# Patient Record
Sex: FEMALE | Race: WHITE | HISPANIC OR LATINO | Employment: FULL TIME | URBAN - METROPOLITAN AREA
[De-identification: names, ages, dates, MRNs, and addresses within clinical notes are randomized per-mention and may not be internally consistent; named-entity substitution may affect disease eponyms.]

---

## 2021-09-14 ENCOUNTER — HOSPITAL ENCOUNTER (OUTPATIENT)
Facility: MEDICAL CENTER | Age: 21
End: 2021-09-14
Attending: PREVENTIVE MEDICINE
Payer: COMMERCIAL

## 2021-09-14 ENCOUNTER — EH NON-PROVIDER (OUTPATIENT)
Dept: OCCUPATIONAL MEDICINE | Facility: CLINIC | Age: 21
End: 2021-09-14

## 2021-09-14 ENCOUNTER — EMPLOYEE HEALTH (OUTPATIENT)
Dept: OCCUPATIONAL MEDICINE | Facility: CLINIC | Age: 21
End: 2021-09-14

## 2021-09-14 DIAGNOSIS — Z02.1 PRE-EMPLOYMENT HEALTH SCREENING EXAMINATION: ICD-10-CM

## 2021-09-14 DIAGNOSIS — Z02.1 PRE-EMPLOYMENT DRUG SCREENING: Primary | ICD-10-CM

## 2021-09-14 DIAGNOSIS — Z02.1 PRE-EMPLOYMENT DRUG SCREENING: ICD-10-CM

## 2021-09-14 LAB
AMP AMPHETAMINE: NORMAL
BAR BARBITURATES: NORMAL
BZO BENZODIAZEPINES: NORMAL
COC COCAINE: NORMAL
INT CON NEG: NORMAL
INT CON POS: NORMAL
MDMA ECSTASY: NORMAL
MET METHAMPHETAMINES: NORMAL
MTD METHADONE: NORMAL
OPI OPIATES: NORMAL
OXY OXYCODONE: NORMAL
PCP PHENCYCLIDINE: NORMAL
POC URINE DRUG SCREEN OCDRS: NEGATIVE
THC: NORMAL

## 2021-09-14 PROCEDURE — 94375 RESPIRATORY FLOW VOLUME LOOP: CPT | Performed by: PREVENTIVE MEDICINE

## 2021-09-14 PROCEDURE — 8915 PR COMPREHENSIVE PHYSICAL: Performed by: NURSE PRACTITIONER

## 2021-09-14 PROCEDURE — 86480 TB TEST CELL IMMUN MEASURE: CPT | Performed by: PREVENTIVE MEDICINE

## 2021-09-14 PROCEDURE — 80305 DRUG TEST PRSMV DIR OPT OBS: CPT | Performed by: PREVENTIVE MEDICINE

## 2021-09-15 LAB
GAMMA INTERFERON BACKGROUND BLD IA-ACNC: 0.04 IU/ML
M TB IFN-G BLD-IMP: NEGATIVE
M TB IFN-G CD4+ BCKGRND COR BLD-ACNC: 0.04 IU/ML
MITOGEN IGNF BCKGRD COR BLD-ACNC: >10 IU/ML
QFT TB2 - NIL TBQ2: 0.01 IU/ML

## 2021-09-17 ENCOUNTER — EH NON-PROVIDER (OUTPATIENT)
Dept: OCCUPATIONAL MEDICINE | Facility: CLINIC | Age: 21
End: 2021-09-17

## 2021-09-17 DIAGNOSIS — Z71.85 IMMUNIZATION COUNSELING: ICD-10-CM

## 2022-03-31 ENCOUNTER — HOSPITAL ENCOUNTER (OUTPATIENT)
Facility: MEDICAL CENTER | Age: 22
End: 2022-03-31
Attending: OBSTETRICS & GYNECOLOGY
Payer: COMMERCIAL

## 2022-03-31 ENCOUNTER — HOSPITAL ENCOUNTER (OUTPATIENT)
Dept: LAB | Facility: MEDICAL CENTER | Age: 22
End: 2022-03-31
Attending: OBSTETRICS & GYNECOLOGY
Payer: COMMERCIAL

## 2022-03-31 PROCEDURE — 86900 BLOOD TYPING SEROLOGIC ABO: CPT

## 2022-03-31 PROCEDURE — 86803 HEPATITIS C AB TEST: CPT

## 2022-03-31 PROCEDURE — 82306 VITAMIN D 25 HYDROXY: CPT

## 2022-03-31 PROCEDURE — 85025 COMPLETE CBC W/AUTO DIFF WBC: CPT

## 2022-03-31 PROCEDURE — 86901 BLOOD TYPING SEROLOGIC RH(D): CPT

## 2022-03-31 PROCEDURE — 86850 RBC ANTIBODY SCREEN: CPT

## 2022-03-31 PROCEDURE — 87086 URINE CULTURE/COLONY COUNT: CPT

## 2022-03-31 PROCEDURE — 86762 RUBELLA ANTIBODY: CPT

## 2022-03-31 PROCEDURE — 86780 TREPONEMA PALLIDUM: CPT

## 2022-03-31 PROCEDURE — 87389 HIV-1 AG W/HIV-1&-2 AB AG IA: CPT

## 2022-03-31 PROCEDURE — 87340 HEPATITIS B SURFACE AG IA: CPT

## 2022-04-01 LAB
25(OH)D3 SERPL-MCNC: 19 NG/ML (ref 30–100)
ABO GROUP BLD: NORMAL
BASOPHILS # BLD AUTO: 0.3 % (ref 0–1.8)
BASOPHILS # BLD: 0.03 K/UL (ref 0–0.12)
BLD GP AB SCN SERPL QL: NORMAL
EOSINOPHIL # BLD AUTO: 0.11 K/UL (ref 0–0.51)
EOSINOPHIL NFR BLD: 1.2 % (ref 0–6.9)
ERYTHROCYTE [DISTWIDTH] IN BLOOD BY AUTOMATED COUNT: 48.6 FL (ref 35.9–50)
HBV SURFACE AG SER QL: NORMAL
HCT VFR BLD AUTO: 40.8 % (ref 37–47)
HCV AB SER QL: NORMAL
HGB BLD-MCNC: 12.8 G/DL (ref 12–16)
HIV 1+2 AB+HIV1 P24 AG SERPL QL IA: NORMAL
IMM GRANULOCYTES # BLD AUTO: 0.03 K/UL (ref 0–0.11)
IMM GRANULOCYTES NFR BLD AUTO: 0.3 % (ref 0–0.9)
LYMPHOCYTES # BLD AUTO: 1.96 K/UL (ref 1–4.8)
LYMPHOCYTES NFR BLD: 22.2 % (ref 22–41)
MCH RBC QN AUTO: 28.4 PG (ref 27–33)
MCHC RBC AUTO-ENTMCNC: 31.4 G/DL (ref 33.6–35)
MCV RBC AUTO: 90.7 FL (ref 81.4–97.8)
MONOCYTES # BLD AUTO: 0.49 K/UL (ref 0–0.85)
MONOCYTES NFR BLD AUTO: 5.6 % (ref 0–13.4)
NEUTROPHILS # BLD AUTO: 6.19 K/UL (ref 2–7.15)
NEUTROPHILS NFR BLD: 70.4 % (ref 44–72)
NRBC # BLD AUTO: 0 K/UL
NRBC BLD-RTO: 0 /100 WBC
PLATELET # BLD AUTO: 376 K/UL (ref 164–446)
PMV BLD AUTO: 9.6 FL (ref 9–12.9)
RBC # BLD AUTO: 4.5 M/UL (ref 4.2–5.4)
RH BLD: NORMAL
RUBV AB SER QL: 113 IU/ML
T PALLIDUM AB SER QL IA: NORMAL
WBC # BLD AUTO: 8.8 K/UL (ref 4.8–10.8)

## 2022-04-03 LAB
BACTERIA UR CULT: NORMAL
SIGNIFICANT IND 70042: NORMAL
SITE SITE: NORMAL
SOURCE SOURCE: NORMAL

## 2022-07-29 ENCOUNTER — HOSPITAL ENCOUNTER (OUTPATIENT)
Dept: LAB | Facility: MEDICAL CENTER | Age: 22
End: 2022-07-29
Attending: NURSE PRACTITIONER
Payer: COMMERCIAL

## 2022-07-29 LAB
25(OH)D3 SERPL-MCNC: 22 NG/ML (ref 30–100)
ERYTHROCYTE [DISTWIDTH] IN BLOOD BY AUTOMATED COUNT: 44 FL (ref 35.9–50)
GLUCOSE 1H P 50 G GLC PO SERPL-MCNC: 77 MG/DL (ref 70–139)
HCT VFR BLD AUTO: 33.2 % (ref 37–47)
HGB BLD-MCNC: 11.4 G/DL (ref 12–16)
MCH RBC QN AUTO: 31 PG (ref 27–33)
MCHC RBC AUTO-ENTMCNC: 34.3 G/DL (ref 33.6–35)
MCV RBC AUTO: 90.2 FL (ref 81.4–97.8)
PLATELET # BLD AUTO: 353 K/UL (ref 164–446)
PMV BLD AUTO: 9.7 FL (ref 9–12.9)
RBC # BLD AUTO: 3.68 M/UL (ref 4.2–5.4)
T PALLIDUM AB SER QL IA: NORMAL
WBC # BLD AUTO: 11.4 K/UL (ref 4.8–10.8)

## 2022-07-29 PROCEDURE — 36415 COLL VENOUS BLD VENIPUNCTURE: CPT

## 2022-07-29 PROCEDURE — 82950 GLUCOSE TEST: CPT

## 2022-07-29 PROCEDURE — 85027 COMPLETE CBC AUTOMATED: CPT

## 2022-07-29 PROCEDURE — 82306 VITAMIN D 25 HYDROXY: CPT

## 2022-07-29 PROCEDURE — 86780 TREPONEMA PALLIDUM: CPT

## 2022-10-23 ENCOUNTER — HOSPITAL ENCOUNTER (EMERGENCY)
Facility: MEDICAL CENTER | Age: 22
End: 2022-10-23
Attending: OBSTETRICS & GYNECOLOGY | Admitting: OBSTETRICS & GYNECOLOGY
Payer: COMMERCIAL

## 2022-10-23 VITALS
HEART RATE: 81 BPM | RESPIRATION RATE: 18 BRPM | DIASTOLIC BLOOD PRESSURE: 73 MMHG | SYSTOLIC BLOOD PRESSURE: 117 MMHG | HEIGHT: 59 IN | BODY MASS INDEX: 27.01 KG/M2 | WEIGHT: 134 LBS | TEMPERATURE: 97 F

## 2022-10-23 PROCEDURE — 59025 FETAL NON-STRESS TEST: CPT

## 2022-10-23 PROCEDURE — 99282 EMERGENCY DEPT VISIT SF MDM: CPT

## 2022-10-23 ASSESSMENT — PAIN SCALES - GENERAL: PAINLEVEL: 4

## 2022-10-23 NOTE — ED PROVIDER NOTES
"Department of Obstetrics and Gynecology  Labor and Delivery History and Physical  OB ED Note    Date of Admission: 10/23/2022     ID: 22 y.o.  with IUP at 36w5d. CINDI 11/15/2022    Primary OB: Pattie Ron M.D.     Attending OB: Sherron Claros M.D.     CC: pelvic pressure     HPI: Korina Potts is a 22 y.o.  at 36 weeks and 5 days who presents to OB triage with pelvic pressure.  This is been present for approximately 3 days.  She has irregular contractions.  She denies loss of fluid or vaginal bleeding.  She notes a slight increase in vaginal discharge.  She reports good fetal movement.  Her cervical exam in the office last week was 3 cm dilated.  Her group B strep has been collected and is pending    ROS: 10 systems reviewed and negative except as noted above.    Obstetric History    - , term , female, [id number], No complications, \"Arely,\" Delivery in [place]  G2 - Current         Past Medical History  Surgical History   Asthma (mild intermittent)  none      Gynecologic History  Social History   regular menses prior to pregnancy  denies Hx of abnormal pap smears.  denies Hx of STIs Tobacco: denies  EtOH: denies  Street Drugs: denies  . Presents with  Rosalio. She is an RN here on [employer]      Medications  Allergies   No current facility-administered medications on file prior to encounter.     No current outpatient medications on file prior to encounter.    Patient has no allergy information on record.       O: /73   Pulse 81   Temp 36.1 °C (97 °F) (Temporal)   Resp 18   Ht 1.499 m (4' 11\")   Wt 60.8 kg (134 lb)   LMP 2022   BMI 27.06 kg/m²     Gen: NAD, AAO  Abd: Gravid, NTTP,Cephalic by Leopolds, No rebound or guarding  Ext: NTTP, no edema, 2+DPP  Pelvic: SVE 3/70/-1 vertex per RN    FHT:  150 with moderate LTV. +accels. No variables. Occ early decels  Holiday: CTX irregular    Labs: none     A/P: Korina Potts is a 22 y.o. "  at 36 weeks and 5 days with pelvic pressure  Rule out labor: The patient is having irregular contractions but no evidence of active labor.  Her cervical exam is unchanged from previous this week.  We discussed pelvic pressure is a normal symptom at this point in pregnancy.  Supportive measures including use of bathtub and maternity support belt have been reviewed.  Fetal surveillance is reassuring for gestational age  Fetal kick counts reviewed  Follow-up with Dr. Ron as previously scheduled on Wednesday of this week    Sherron Claros M.D.,  10/23/2022, 12:24 PM

## 2022-10-23 NOTE — PROGRESS NOTES
EDC     11/15/2022     EGA    36w5d    1200: TOCO/US applied, pt educated. Pt presents with c/o lower abdominal pressure rating her pain at a 4-5 using a pain scale 0 to 10. Pt states she feels occasional UC's, some which are painful and some which just feel like tightening in the abdomen. Pt also c/o lower back pain that comes and goes. Pt declines LOF, VB, and states +FM. Pt declines any complications during this pregnancy. POC discussed, all questions and concerns addressed.       1208: SVE 3/70/-1    1220:  Working at bedside with patient, orders received to discharge patient home after reactive NST.     1300: Dr. Claros reviewed tracing, orders received to discharge patient home.     1309: Discharge instructions gone over with patient, all questions and concerns addressed.

## 2022-11-07 ENCOUNTER — HOSPITAL ENCOUNTER (INPATIENT)
Facility: MEDICAL CENTER | Age: 22
LOS: 2 days | End: 2022-11-09
Attending: OBSTETRICS & GYNECOLOGY | Admitting: OBSTETRICS & GYNECOLOGY
Payer: COMMERCIAL

## 2022-11-07 ENCOUNTER — ANESTHESIA (OUTPATIENT)
Dept: ANESTHESIOLOGY | Facility: MEDICAL CENTER | Age: 22
End: 2022-11-07
Payer: COMMERCIAL

## 2022-11-07 ENCOUNTER — ANESTHESIA EVENT (OUTPATIENT)
Dept: ANESTHESIOLOGY | Facility: MEDICAL CENTER | Age: 22
End: 2022-11-07
Payer: COMMERCIAL

## 2022-11-07 LAB
BASOPHILS # BLD AUTO: 0.5 % (ref 0–1.8)
BASOPHILS # BLD: 0.05 K/UL (ref 0–0.12)
EOSINOPHIL # BLD AUTO: 0.08 K/UL (ref 0–0.51)
EOSINOPHIL NFR BLD: 0.9 % (ref 0–6.9)
ERYTHROCYTE [DISTWIDTH] IN BLOOD BY AUTOMATED COUNT: 41.5 FL (ref 35.9–50)
HCT VFR BLD AUTO: 37 % (ref 37–47)
HGB BLD-MCNC: 12.5 G/DL (ref 12–16)
HOLDING TUBE BB 8507: NORMAL
IMM GRANULOCYTES # BLD AUTO: 0.04 K/UL (ref 0–0.11)
IMM GRANULOCYTES NFR BLD AUTO: 0.4 % (ref 0–0.9)
LYMPHOCYTES # BLD AUTO: 1.77 K/UL (ref 1–4.8)
LYMPHOCYTES NFR BLD: 19.2 % (ref 22–41)
MCH RBC QN AUTO: 29.7 PG (ref 27–33)
MCHC RBC AUTO-ENTMCNC: 33.8 G/DL (ref 33.6–35)
MCV RBC AUTO: 87.9 FL (ref 81.4–97.8)
MONOCYTES # BLD AUTO: 0.57 K/UL (ref 0–0.85)
MONOCYTES NFR BLD AUTO: 6.2 % (ref 0–13.4)
NEUTROPHILS # BLD AUTO: 6.73 K/UL (ref 2–7.15)
NEUTROPHILS NFR BLD: 72.8 % (ref 44–72)
NRBC # BLD AUTO: 0 K/UL
NRBC BLD-RTO: 0 /100 WBC
PLATELET # BLD AUTO: 314 K/UL (ref 164–446)
PMV BLD AUTO: 10.6 FL (ref 9–12.9)
RBC # BLD AUTO: 4.21 M/UL (ref 4.2–5.4)
T PALLIDUM AB SER QL IA: NORMAL
WBC # BLD AUTO: 9.2 K/UL (ref 4.8–10.8)

## 2022-11-07 PROCEDURE — 700111 HCHG RX REV CODE 636 W/ 250 OVERRIDE (IP): Performed by: ANESTHESIOLOGY

## 2022-11-07 PROCEDURE — 86780 TREPONEMA PALLIDUM: CPT

## 2022-11-07 PROCEDURE — 303615 HCHG EPIDURAL/SPINAL ANESTHESIA FOR LABOR

## 2022-11-07 PROCEDURE — 0KQM0ZZ REPAIR PERINEUM MUSCLE, OPEN APPROACH: ICD-10-PCS | Performed by: OBSTETRICS & GYNECOLOGY

## 2022-11-07 PROCEDURE — 700111 HCHG RX REV CODE 636 W/ 250 OVERRIDE (IP): Performed by: OBSTETRICS & GYNECOLOGY

## 2022-11-07 PROCEDURE — 700111 HCHG RX REV CODE 636 W/ 250 OVERRIDE (IP)

## 2022-11-07 PROCEDURE — 770002 HCHG ROOM/CARE - OB PRIVATE (112)

## 2022-11-07 PROCEDURE — 85025 COMPLETE CBC W/AUTO DIFF WBC: CPT

## 2022-11-07 PROCEDURE — 36415 COLL VENOUS BLD VENIPUNCTURE: CPT

## 2022-11-07 PROCEDURE — 302449 STATCHG TRIAGE ONLY (STATISTIC)

## 2022-11-07 PROCEDURE — 01967 NEURAXL LBR ANES VAG DLVR: CPT | Performed by: ANESTHESIOLOGY

## 2022-11-07 PROCEDURE — 700105 HCHG RX REV CODE 258: Performed by: OBSTETRICS & GYNECOLOGY

## 2022-11-07 RX ORDER — TERBUTALINE SULFATE 1 MG/ML
0.25 INJECTION, SOLUTION SUBCUTANEOUS
Status: COMPLETED | OUTPATIENT
Start: 2022-11-07 | End: 2022-11-07

## 2022-11-07 RX ORDER — IBUPROFEN 800 MG/1
800 TABLET ORAL
Status: DISCONTINUED | OUTPATIENT
Start: 2022-11-07 | End: 2022-11-08 | Stop reason: HOSPADM

## 2022-11-07 RX ORDER — SODIUM CHLORIDE, SODIUM LACTATE, POTASSIUM CHLORIDE, AND CALCIUM CHLORIDE .6; .31; .03; .02 G/100ML; G/100ML; G/100ML; G/100ML
1000 INJECTION, SOLUTION INTRAVENOUS
Status: DISCONTINUED | OUTPATIENT
Start: 2022-11-07 | End: 2022-11-08 | Stop reason: HOSPADM

## 2022-11-07 RX ORDER — BUPIVACAINE HYDROCHLORIDE 2.5 MG/ML
INJECTION, SOLUTION EPIDURAL; INFILTRATION; INTRACAUDAL
Status: COMPLETED
Start: 2022-11-07 | End: 2022-11-07

## 2022-11-07 RX ORDER — BUPIVACAINE HYDROCHLORIDE 2.5 MG/ML
INJECTION, SOLUTION EPIDURAL; INFILTRATION; INTRACAUDAL PRN
Status: DISCONTINUED | OUTPATIENT
Start: 2022-11-07 | End: 2022-11-08 | Stop reason: SURG

## 2022-11-07 RX ORDER — OXYTOCIN 10 [USP'U]/ML
10 INJECTION, SOLUTION INTRAMUSCULAR; INTRAVENOUS
Status: DISCONTINUED | OUTPATIENT
Start: 2022-11-07 | End: 2022-11-08 | Stop reason: HOSPADM

## 2022-11-07 RX ORDER — ONDANSETRON 4 MG/1
4 TABLET, ORALLY DISINTEGRATING ORAL EVERY 6 HOURS PRN
Status: DISCONTINUED | OUTPATIENT
Start: 2022-11-07 | End: 2022-11-08 | Stop reason: HOSPADM

## 2022-11-07 RX ORDER — ROPIVACAINE HYDROCHLORIDE 2 MG/ML
INJECTION, SOLUTION EPIDURAL; INFILTRATION; PERINEURAL
Status: COMPLETED
Start: 2022-11-07 | End: 2022-11-07

## 2022-11-07 RX ORDER — SODIUM CHLORIDE, SODIUM LACTATE, POTASSIUM CHLORIDE, AND CALCIUM CHLORIDE .6; .31; .03; .02 G/100ML; G/100ML; G/100ML; G/100ML
250 INJECTION, SOLUTION INTRAVENOUS PRN
Status: DISCONTINUED | OUTPATIENT
Start: 2022-11-07 | End: 2022-11-08 | Stop reason: HOSPADM

## 2022-11-07 RX ORDER — ACETAMINOPHEN 500 MG
1000 TABLET ORAL
Status: DISCONTINUED | OUTPATIENT
Start: 2022-11-07 | End: 2022-11-08 | Stop reason: HOSPADM

## 2022-11-07 RX ORDER — ONDANSETRON 2 MG/ML
4 INJECTION INTRAMUSCULAR; INTRAVENOUS EVERY 6 HOURS PRN
Status: DISCONTINUED | OUTPATIENT
Start: 2022-11-07 | End: 2022-11-08 | Stop reason: HOSPADM

## 2022-11-07 RX ORDER — LIDOCAINE HYDROCHLORIDE 10 MG/ML
20 INJECTION, SOLUTION INFILTRATION; PERINEURAL
Status: DISCONTINUED | OUTPATIENT
Start: 2022-11-07 | End: 2022-11-08 | Stop reason: HOSPADM

## 2022-11-07 RX ORDER — ROPIVACAINE HYDROCHLORIDE 2 MG/ML
INJECTION, SOLUTION EPIDURAL; INFILTRATION; PERINEURAL CONTINUOUS
Status: DISCONTINUED | OUTPATIENT
Start: 2022-11-07 | End: 2022-11-09 | Stop reason: HOSPADM

## 2022-11-07 RX ORDER — SODIUM CHLORIDE, SODIUM LACTATE, POTASSIUM CHLORIDE, CALCIUM CHLORIDE 600; 310; 30; 20 MG/100ML; MG/100ML; MG/100ML; MG/100ML
INJECTION, SOLUTION INTRAVENOUS CONTINUOUS
Status: DISCONTINUED | OUTPATIENT
Start: 2022-11-07 | End: 2022-11-09 | Stop reason: HOSPADM

## 2022-11-07 RX ADMIN — TERBUTALINE SULFATE 0.25 MG: 1 INJECTION SUBCUTANEOUS at 21:51

## 2022-11-07 RX ADMIN — SODIUM CHLORIDE, POTASSIUM CHLORIDE, SODIUM LACTATE AND CALCIUM CHLORIDE: 600; 310; 30; 20 INJECTION, SOLUTION INTRAVENOUS at 21:12

## 2022-11-07 RX ADMIN — BUPIVACAINE HYDROCHLORIDE 3 ML: 2.5 INJECTION, SOLUTION EPIDURAL; INFILTRATION; INTRACAUDAL; PERINEURAL at 21:20

## 2022-11-07 RX ADMIN — FENTANYL CITRATE 50 MCG: 50 INJECTION, SOLUTION INTRAMUSCULAR; INTRAVENOUS at 21:15

## 2022-11-07 RX ADMIN — SODIUM CHLORIDE, POTASSIUM CHLORIDE, SODIUM LACTATE AND CALCIUM CHLORIDE: 600; 310; 30; 20 INJECTION, SOLUTION INTRAVENOUS at 17:30

## 2022-11-07 RX ADMIN — ROPIVACAINE HYDROCHLORIDE: 2 INJECTION, SOLUTION EPIDURAL; INFILTRATION at 21:20

## 2022-11-07 RX ADMIN — FENTANYL CITRATE 100 MCG: 50 INJECTION, SOLUTION INTRAMUSCULAR; INTRAVENOUS at 20:16

## 2022-11-07 RX ADMIN — FENTANYL CITRATE 50 MCG: 50 INJECTION, SOLUTION INTRAMUSCULAR; INTRAVENOUS at 21:20

## 2022-11-07 RX ADMIN — ROPIVACAINE HYDROCHLORIDE: 2 INJECTION, SOLUTION EPIDURAL; INFILTRATION; PERINEURAL at 21:20

## 2022-11-07 RX ADMIN — BUPIVACAINE HYDROCHLORIDE 3 ML: 2.5 INJECTION, SOLUTION EPIDURAL; INFILTRATION; INTRACAUDAL; PERINEURAL at 21:15

## 2022-11-07 ASSESSMENT — PAIN SCALES - GENERAL: PAINLEVEL: 3

## 2022-11-07 ASSESSMENT — LIFESTYLE VARIABLES: EVER_SMOKED: NEVER

## 2022-11-07 ASSESSMENT — PATIENT HEALTH QUESTIONNAIRE - PHQ9
SUM OF ALL RESPONSES TO PHQ9 QUESTIONS 1 AND 2: 0
2. FEELING DOWN, DEPRESSED, IRRITABLE, OR HOPELESS: NOT AT ALL

## 2022-11-08 LAB
ERYTHROCYTE [DISTWIDTH] IN BLOOD BY AUTOMATED COUNT: 41.9 FL (ref 35.9–50)
HCT VFR BLD AUTO: 30.8 % (ref 37–47)
HGB BLD-MCNC: 10.2 G/DL (ref 12–16)
MCH RBC QN AUTO: 29.2 PG (ref 27–33)
MCHC RBC AUTO-ENTMCNC: 33.1 G/DL (ref 33.6–35)
MCV RBC AUTO: 88.3 FL (ref 81.4–97.8)
PLATELET # BLD AUTO: 261 K/UL (ref 164–446)
PMV BLD AUTO: 10.1 FL (ref 9–12.9)
RBC # BLD AUTO: 3.49 M/UL (ref 4.2–5.4)
WBC # BLD AUTO: 12.8 K/UL (ref 4.8–10.8)

## 2022-11-08 PROCEDURE — 700102 HCHG RX REV CODE 250 W/ 637 OVERRIDE(OP): Performed by: OBSTETRICS & GYNECOLOGY

## 2022-11-08 PROCEDURE — 304965 HCHG RECOVERY SERVICES

## 2022-11-08 PROCEDURE — 770002 HCHG ROOM/CARE - OB PRIVATE (112)

## 2022-11-08 PROCEDURE — 700105 HCHG RX REV CODE 258: Performed by: OBSTETRICS & GYNECOLOGY

## 2022-11-08 PROCEDURE — 36415 COLL VENOUS BLD VENIPUNCTURE: CPT

## 2022-11-08 PROCEDURE — 85027 COMPLETE CBC AUTOMATED: CPT

## 2022-11-08 PROCEDURE — 59409 OBSTETRICAL CARE: CPT

## 2022-11-08 PROCEDURE — 700111 HCHG RX REV CODE 636 W/ 250 OVERRIDE (IP): Performed by: OBSTETRICS & GYNECOLOGY

## 2022-11-08 PROCEDURE — A9270 NON-COVERED ITEM OR SERVICE: HCPCS | Performed by: OBSTETRICS & GYNECOLOGY

## 2022-11-08 RX ORDER — DOCUSATE SODIUM 100 MG/1
100 CAPSULE, LIQUID FILLED ORAL 2 TIMES DAILY PRN
Status: DISCONTINUED | OUTPATIENT
Start: 2022-11-08 | End: 2022-11-09 | Stop reason: HOSPADM

## 2022-11-08 RX ORDER — VITAMIN A ACETATE, BETA CAROTENE, ASCORBIC ACID, CHOLECALCIFEROL, .ALPHA.-TOCOPHEROL ACETATE, DL-, THIAMINE MONONITRATE, RIBOFLAVIN, NIACINAMIDE, PYRIDOXINE HYDROCHLORIDE, FOLIC ACID, CYANOCOBALAMIN, CALCIUM CARBONATE, FERROUS FUMARATE, ZINC OXIDE, CUPRIC OXIDE 3080; 12; 120; 400; 1; 1.84; 3; 20; 22; 920; 25; 200; 27; 10; 2 [IU]/1; UG/1; MG/1; [IU]/1; MG/1; MG/1; MG/1; MG/1; MG/1; [IU]/1; MG/1; MG/1; MG/1; MG/1; MG/1
1 TABLET, FILM COATED ORAL EVERY MORNING
Status: DISCONTINUED | OUTPATIENT
Start: 2022-11-08 | End: 2022-11-09 | Stop reason: HOSPADM

## 2022-11-08 RX ORDER — ACETAMINOPHEN 500 MG
1000 TABLET ORAL EVERY 6 HOURS PRN
Status: DISCONTINUED | OUTPATIENT
Start: 2022-11-08 | End: 2022-11-09 | Stop reason: HOSPADM

## 2022-11-08 RX ORDER — SODIUM CHLORIDE, SODIUM LACTATE, POTASSIUM CHLORIDE, CALCIUM CHLORIDE 600; 310; 30; 20 MG/100ML; MG/100ML; MG/100ML; MG/100ML
INJECTION, SOLUTION INTRAVENOUS PRN
Status: DISCONTINUED | OUTPATIENT
Start: 2022-11-08 | End: 2022-11-09 | Stop reason: HOSPADM

## 2022-11-08 RX ORDER — IBUPROFEN 800 MG/1
800 TABLET ORAL EVERY 8 HOURS PRN
Status: DISCONTINUED | OUTPATIENT
Start: 2022-11-08 | End: 2022-11-09 | Stop reason: HOSPADM

## 2022-11-08 RX ORDER — OXYCODONE HYDROCHLORIDE 5 MG/1
5 TABLET ORAL EVERY 4 HOURS PRN
Status: DISCONTINUED | OUTPATIENT
Start: 2022-11-08 | End: 2022-11-09 | Stop reason: HOSPADM

## 2022-11-08 RX ORDER — MISOPROSTOL 200 UG/1
600 TABLET ORAL
Status: DISCONTINUED | OUTPATIENT
Start: 2022-11-08 | End: 2022-11-09 | Stop reason: HOSPADM

## 2022-11-08 RX ADMIN — OXYTOCIN 125 ML/HR: 10 INJECTION, SOLUTION INTRAMUSCULAR; INTRAVENOUS at 01:48

## 2022-11-08 RX ADMIN — ACETAMINOPHEN 1000 MG: 500 TABLET ORAL at 09:39

## 2022-11-08 RX ADMIN — ACETAMINOPHEN 1000 MG: 500 TABLET ORAL at 03:16

## 2022-11-08 RX ADMIN — ACETAMINOPHEN 1000 MG: 500 TABLET ORAL at 16:24

## 2022-11-08 RX ADMIN — OXYTOCIN 10 UNITS: 10 INJECTION, SOLUTION INTRAMUSCULAR; INTRAVENOUS at 00:52

## 2022-11-08 RX ADMIN — IBUPROFEN 800 MG: 800 TABLET, FILM COATED ORAL at 03:16

## 2022-11-08 RX ADMIN — PRENATAL WITH FERROUS FUM AND FOLIC ACID 1 TABLET: 3080; 920; 120; 400; 22; 1.84; 3; 20; 10; 1; 12; 200; 27; 25; 2 TABLET ORAL at 09:39

## 2022-11-08 RX ADMIN — IBUPROFEN 800 MG: 800 TABLET, FILM COATED ORAL at 09:40

## 2022-11-08 ASSESSMENT — EDINBURGH POSTNATAL DEPRESSION SCALE (EPDS)
I HAVE BEEN SO UNHAPPY THAT I HAVE BEEN CRYING: NO, NEVER
THINGS HAVE BEEN GETTING ON TOP OF ME: NO, I HAVE BEEN COPING AS WELL AS EVER
I HAVE LOOKED FORWARD WITH ENJOYMENT TO THINGS: AS MUCH AS I EVER DID
I HAVE BEEN ABLE TO LAUGH AND SEE THE FUNNY SIDE OF THINGS: AS MUCH AS I ALWAYS COULD
I HAVE BEEN SO UNHAPPY THAT I HAVE HAD DIFFICULTY SLEEPING: NOT AT ALL
I HAVE BLAMED MYSELF UNNECESSARILY WHEN THINGS WENT WRONG: NO, NEVER
I HAVE BEEN ANXIOUS OR WORRIED FOR NO GOOD REASON: NO, NOT AT ALL
I HAVE FELT SAD OR MISERABLE: NO, NOT AT ALL
THE THOUGHT OF HARMING MYSELF HAS OCCURRED TO ME: NEVER
I HAVE FELT SCARED OR PANICKY FOR NO GOOD REASON: NO, NOT AT ALL

## 2022-11-08 ASSESSMENT — PAIN DESCRIPTION - PAIN TYPE
TYPE: ACUTE PAIN

## 2022-11-08 ASSESSMENT — PAIN SCALES - GENERAL: PAIN_LEVEL: 1

## 2022-11-08 NOTE — H&P
"DATE OF ADMISSION:  2022     IDENTIFICATION:  This is a 22-year-old  2, para 1-0-0-1 with an EDC of   11/15/2022 and EGA of 38 and 6/7th weeks, who presents with a chief complaint   of \"I broke my water.\"     HISTORY OF PRESENT ILLNESS:  This is a patient of Dr. Ron who has gotten   good prenatal care.  Prenatal care has been uncomplicated.  She presents today   complaining of spontaneous rupture of membranes, cleared at about 3:10 this   afternoon.  Denies nausea, vomiting, fever, chills, change in bowel or bladder   habits.  Admits to good fetal movement.  Denies symptoms of PIH.  Denies   vaginal bleeding.  Here in labor and delivery, fetal heart tracings reactive,   category 1.  She is alberto irregularly.  Her cervix is 3, 50, -2.     OBSTETRICAL HISTORY:  Significant for previous vaginal delivery.     GYNECOLOGIC HISTORY:  Denies STDs, abnormal Paps.     PAST MEDICAL HISTORY:  ALLERGIES:  None.     CURRENT MEDICATIONS:  Prenatal vitamins.     MEDICAL PROBLEMS:  Asthma.  Denies recent intubations or hospitalizations.     PAST SURGICAL HISTORY:  Appendectomy.     SOCIAL HISTORY:  Denies alcohol, tobacco or drug abuse.     FAMILY HISTORY:  Noncontributory.     REVIEW OF SYSTEMS:  Times 12 is negative per AMA standards available in chart.     LABORATORY DATA:  She is beta strep negative.     PHYSICAL EXAMINATION:    VITAL SIGNS:  The patient is afebrile.  Vital signs within normal limits.    Fetal heart tracings reactive, category 1.  She is alberto irregularly.  GENERAL:  She is awake, alert, uncomfortable with her contractions but in no   apparent distress.  NECK:  Supple.  HEART:  Regular.  CHEST:  Clear.  BREASTS:  Symmetrical.  ABDOMEN:  Soft, gravid, size appropriate for dates.  EXTREMITIES:  Negative.  GYNECOLOGIC:  As above.     ASSESSMENT:  At this time:  1.  Pregnancy at 38 and 6/7th weeks with spontaneous rupture of membranes,   latent labor.  2.  Fetal status reassuring.   "   PLAN:  At this time;  1.  Admit.  2.  Fetal heart tone and contraction monitoring.  3.  Pain control.  4.  Pitocin if needed.  5.  Consent for an anticipated normal spontaneous vaginal delivery.        ______________________________  MD RIMA Braun/MICHAEL    DD:  11/07/2022 18:54  DT:  11/07/2022 19:08    Job#:  [id number]

## 2022-11-08 NOTE — PROGRESS NOTES
Assessment completed WDL. Pt states that she would like pain medication when it is due. Plan of care discussed. Pt encouraged to call with needs.

## 2022-11-08 NOTE — ANESTHESIA PREPROCEDURE EVALUATION
Date: 22  Procedure: Labor Epidural        rodriguez pregnancy at 38+6 weeks gestation. No significant medical hx or pregnancy complications.    Relevant Problems   No relevant active problems       Physical Exam    Airway   Mallampati: II  TM distance: >3 FB  Neck ROM: full       Cardiovascular - normal exam  Rhythm: regular  Rate: normal  (-) murmur     Dental - normal exam           Pulmonary - normal exam  Breath sounds clear to auscultation     Abdominal    Neurological - normal exam                 Anesthesia Plan    ASA 2       Plan - epidural   Neuraxial block will be labor analgesia                  Pertinent diagnostic labs and testing reviewed    Informed Consent:    Anesthetic plan and risks discussed with patient.

## 2022-11-08 NOTE — PROGRESS NOTES
Pt is a ; CINDI of 11/15; making her 38.6. Pt here c/o of SROM at 1510 with clear fluid. Denies VB, reports +FM and occas. Ucs. Oral hydration offered. EFM and TOCO applied. VSS. SSE performed pt found to be grossly ruptured with clear fluid. SVE at 3/50/-2. Dr Poole notified. Orders to admit pt. IV started, labs drawn, admission profile completed. Pt resting comfortable and will notify RN if interventions are desired.     Report given to Jose Alberto LY.

## 2022-11-08 NOTE — CARE PLAN
Problem: Knowledge Deficit - L&D  Goal: Patient and family/caregivers will demonstrate understanding of plan of care, disease process/condition, diagnostic tests and medications  Outcome: Progressing     Problem: Pain  Goal: Patient's pain will be alleviated or reduced to the patient’s comfort goal  Outcome: Progressing   The patient is Stable - Low risk of patient condition declining or worsening         Progress made toward(s) clinical / shift goals:  Pt desires natural as possible delivery with minimal interventions. Will reassess pain q hour with goal to be unmedicated delivery. Pt educated on options and will notify RN if interventions are desired.     Pt POC discussed with pt and FOB. Will continue to educate and answer questions through out labor process.

## 2022-11-08 NOTE — PROGRESS NOTES
Admitted patient from Labor and Delivery, oriented patient to room, including call light, emergency call light, television, bed controls,and lights. Plan of care discussed and patient verbalized understanding. Assessment completed, fundus firm and lochia light. Abdominal incision with mepilex silver dressing intact. Patient will call if PRN pain medication intervention needed.

## 2022-11-08 NOTE — CARE PLAN
The patient is Stable - Low risk of patient condition declining or worsening    Shift Goals  Clinical Goals: maintian vitals    Progress made toward(s) clinical / shift goals:        Problem: Altered Physiologic Condition  Goal: Patient physiologically stable as evidenced by normal lochia, palpable uterine involution and vitals within normal limits  Outcome: Progressing  Note: Fundus firm, lochia light, ambulating.      Problem: Infection - Postpartum  Goal: Postpartum patient will be free of signs and symptoms of infection  Outcome: Progressing  Note: Patient remains free from signs and symptoms of infection, vital signs stable.

## 2022-11-08 NOTE — ANESTHESIA PROCEDURE NOTES
Epidural Block    Date/Time: 11/7/2022 9:09 PM  Performed by: Villa Islas M.D.  Authorized by: Villa Islas M.D.     Patient Location:  OB  Start Time:  11/7/2022 9:09 PM  End Time:  11/7/2022 9:15 PM  Reason for Block: labor analgesia    patient identified, IV checked, site marked, risks and benefits discussed, surgical consent, monitors and equipment checked, pre-op evaluation and timeout performed    Patient Position:  Sitting  Prep: ChloraPrep, patient draped and sterile technique    Monitoring:  Blood pressure, continuous pulse oximetry and heart rate  Approach:  Midline  Location:  L3-L4  Injection Technique:  KAYLEE saline  Skin infiltration:  Lidocaine  Strength:  1%  Dose:  3ml  Needle Type:  Tuohy  Needle Gauge:  17 G  Needle Length:  3.5 in  Loss of resistance::  4  Catheter Size:  19 G  Catheter at Skin Depth:  10  Test Dose Result:  Negative   Success on 1st pass  No heme/CSF  Catheter threaded easily  Negative aspiration  No evidence of complications  Patient comfortable after loading dose

## 2022-11-08 NOTE — ANESTHESIA POSTPROCEDURE EVALUATION
Patient: Korina Potts    Procedure Summary     Date: 11/07/22 Room / Location:     Anesthesia Start: 2109 Anesthesia Stop: 11/08/22 0046    Procedure: Labor Epidural Diagnosis:     Scheduled Providers:  Responsible Provider: Villa Islas M.D.    Anesthesia Type: epidural ASA Status: 2          Final Anesthesia Type: epidural  Last vitals  BP   Blood Pressure: 132/79    Temp 97.8       Pulse   66   Resp   16    SpO2   99 %      Anesthesia Post Evaluation    Patient location during evaluation: floor  Patient participation: complete - patient participated  Level of consciousness: awake and alert  Pain score: 1    Airway patency: patent  Anesthetic complications: no  Cardiovascular status: hemodynamically stable  Respiratory status: acceptable  Hydration status: euvolemic    PONV: none          No notable events documented.     Nurse Pain Score: 1 (NPRS)

## 2022-11-08 NOTE — L&D DELIVERY NOTE
DATE OF SERVICE:  2022        IDENTIFICATION:  This is a 22-year-old  2, para 1 with an EDC of   11/15/2022 and EGA of 38 and 6/7th weeks, who presents with a chief complaint   of spontaneous rupture of membranes.     HISTORY OF PRESENT ILLNESS:  The patient of Dr. Ron's who has gotten good   prenatal care.  Prenatal care has been uncomplicated.  She presents yesterday   complaining of spontaneous rupture of membranes, cleared at about 3:10.  This   was confirmed.  Her cervix was 3, 50 and -2.  She was subsequently admitted.    She received an epidural for pain control, was started on Pitocin, progressed   over normal labor curve to complete with an overall reassuring fetal heart   tracing.  At complete, she was able to push baby down to +3 station, extend   the baby's head and deliver atraumatically.  Nares and mouth were bulb   suctioned.  There was no nuchal cord.  The shoulders and body followed without   complication.  After delay, the cord was clamped and cut.  Cord gases were   collected and sent.  The infant was handed off to waiting nursing team.  At   this point, the placenta delivered intact, 3-vessel cord.  Uterus firmed up   nicely after 20 units of Pitocin.  Cervix was intact.  There was a small   second-degree midline laceration, which was repaired with 3-0 Vicryl running   and locked above the hymenal ring, running below the hymenal ring was   subcuticular stitch back up to the hymenal ring.  Estimated blood loss was 200   mL.  Patient and baby in recovery room in stable condition.     FINDINGS:  Male infant with Apgars of 8 and 9.     COMPLICATIONS:  There were no complications.              ______________________________  MD RIMA Braun/SUP    DD:  2022 01:05  DT:  2022 02:11    Job#:  [id number]

## 2022-11-08 NOTE — PROGRESS NOTES
Comfortable with epi  Reg uc  Fht cat 2; decel to 70-80 with recovery with reposition and o2  Ant lip  Dw labor curve  Will follow  Anticipate

## 2022-11-08 NOTE — ANESTHESIA TIME REPORT
Anesthesia Start and Stop Event Times     Date Time Event    11/7/2022 2104 Ready for Procedure     2109 Anesthesia Start    11/8/2022 0046 Anesthesia Stop        Responsible Staff  11/07/22 to 11/08/22    Name Role Begin End    Villa Islas M.D. Anesth 2109 0046        Overtime Reason:  no overtime (within assigned shift)    Comments:

## 2022-11-08 NOTE — LACTATION NOTE
This note was copied from a baby's chart.  Met with mother for an initial LC consult. MOB reports that baby has been to breast once, right after delivery. He was in the NBN per her request and received formula during that time. She states that her plan is to mostly breast feed, but she is ok with baby receiving formula as well.   Breast feeding history: MOB reports that her first baby was unable to latch, and she pumped and provided. She reports that her milk supply during that time was good.   Breastfeeding assistance/ education provided: Baby was showing hunger cues, so LC offer assistance with latch. MOB declined assistance at this time, and attempted to latch baby independently in cradle position on the left breast. Baby quickly fell asleep in mother's arms. Education provided regarding the milk making process and supply in demand. Education provided on the importance of breast feeding baby on demand any time he is showing hunger cues, and that stimulation of the breasts by baby nursing will induce milk production. Encouraged frequent skin to skin. Anticipatory guidance provided regarding cluster feeding, and normal infant feeding behaviors in the first 24-28hrs. Education provided regarding the frequency of feedings and that it is important that baby be fed at least 8 times every 24hrs. Encouraged MOB to allow infant to breastfeed on demand during this time, and encouraged her to reach out for assistance with latch as needed. MOB declined further assistance by LC at this time, and she was encouraged to reach out for any questions or concerns in the future.   Plan: Breast feed infant on demand at least 8 times every 24hrs. Supplement with formula per MOB request.

## 2022-11-09 ENCOUNTER — PHARMACY VISIT (OUTPATIENT)
Dept: PHARMACY | Facility: MEDICAL CENTER | Age: 22
End: 2022-11-09
Payer: COMMERCIAL

## 2022-11-09 VITALS
WEIGHT: 135 LBS | HEART RATE: 64 BPM | DIASTOLIC BLOOD PRESSURE: 67 MMHG | OXYGEN SATURATION: 95 % | RESPIRATION RATE: 17 BRPM | HEIGHT: 59 IN | BODY MASS INDEX: 27.21 KG/M2 | SYSTOLIC BLOOD PRESSURE: 114 MMHG | TEMPERATURE: 97.7 F

## 2022-11-09 PROCEDURE — RXMED WILLOW AMBULATORY MEDICATION CHARGE: Performed by: OBSTETRICS & GYNECOLOGY

## 2022-11-09 PROCEDURE — 700102 HCHG RX REV CODE 250 W/ 637 OVERRIDE(OP): Performed by: OBSTETRICS & GYNECOLOGY

## 2022-11-09 PROCEDURE — A9270 NON-COVERED ITEM OR SERVICE: HCPCS | Performed by: OBSTETRICS & GYNECOLOGY

## 2022-11-09 RX ORDER — IBUPROFEN 800 MG/1
800 TABLET ORAL EVERY 8 HOURS PRN
Qty: 30 TABLET | Refills: 0 | Status: SHIPPED | OUTPATIENT
Start: 2022-11-09 | End: 2023-03-17

## 2022-11-09 RX ADMIN — PRENATAL WITH FERROUS FUM AND FOLIC ACID 1 TABLET: 3080; 920; 120; 400; 22; 1.84; 3; 20; 10; 1; 12; 200; 27; 25; 2 TABLET ORAL at 07:59

## 2022-11-09 RX ADMIN — ACETAMINOPHEN 1000 MG: 500 TABLET ORAL at 07:59

## 2022-11-09 RX ADMIN — IBUPROFEN 800 MG: 800 TABLET, FILM COATED ORAL at 02:14

## 2022-11-09 ASSESSMENT — PAIN DESCRIPTION - PAIN TYPE: TYPE: ACUTE PAIN

## 2022-11-09 NOTE — PROGRESS NOTES
Assessment completed WDL. Pt medicated for pain. Plan of care discussed. Pt encouraged to call with needs.

## 2022-11-09 NOTE — DISCHARGE SUMMARY
"Discharge Summary    Admission Date: 2022    Discharge Date: 2022    Diagnosis:Active Problems:     (spontaneous vaginal delivery)    Subjective: Pain controlled. Normal lochia. Eating, voiding and ambulating without difficulty. Working on breastfeeding    /73   Pulse 70   Temp 36.7 °C (98.1 °F) (Temporal)   Resp 16   Ht 1.499 m (4' 11\")   Wt 61.2 kg (135 lb)   LMP 2022   SpO2 97%   Breastfeeding Yes   BMI 27.27 kg/m²       GEN: NAD  GI:soft, NT, ND  :fundus firm and below umbilicus  EXT:no edema    Hospital Course: Patient is a 22-year-old G2, P0 who presented at 39 weeks and 0 days with spontaneous rupture membranes.  She is status post  of a viable male infant with Apgars of 8 and 9 on 2022.  EBL of 200 cc.  Second-degree laceration repaired.  She was meeting all postpartum goals and was requesting discharge home on postpartum day 1.    Discharge Instructions   1. Diet : general  2. Activity: Pelvic rest for 6 weeks    Current Outpatient Medications   Medication Sig Dispense Refill    ibuprofen (MOTRIN) 800 MG Tab Take 1 Tablet by mouth every 8 hours as needed for Moderate Pain. 30 Tablet 0        Follow up: 6 weeks    Complications:none    Lorene Cortez M.D.    "

## 2022-11-09 NOTE — PROGRESS NOTES
Assumed care of patient, report at bedside from,Lida LY. Assessment completed and WDL. Call light within reach, discussed plan of care, denies pain at the moment. Will continue to monitor.

## 2022-11-09 NOTE — DISCHARGE INSTRUCTIONS
PATIENT DISCHARGE EDUCATION INSTRUCTION SHEET    REASONS TO CALL YOUR OBSTETRICIAN  Persistent fever, shaking, chills (Temperature higher than 100.4) may indicate you have an infection  Heavy bleeding: soaking more than 1 pad per hour; Passing clots an egg-sized clot or bigger may mean you have an postpartum hemorrhage  Foul odor from vagina or bad smelling or discolored discharge or blood  Breast infection (Mastitis symptoms); breast pain, chills, fever, redness or red streaks, may feel flu like symptoms  Urinary pain, burning or frequency  Incision that is not healing, increased redness, swelling, tenderness or pain, or any pus from episiotomy or  site may mean you have an infection  Redness, swelling, warmth, or painful to touch in the calf area of your leg may mean you have a blood clot  Severe or intensified depression, thoughts or feelings of wanting to hurt yourself or someone else   Pain in chest, obstructed breathing or shortness of breath (trouble catching your breath) may mean you are having a postpartum complication. Call your provider immediately   Headache that does not get better, even after taking medicine, a bad headache with vision changes or pain in the upper right area of your belly may mean you have high blood pressure or post birth preeclampsia. Call your provider immediately    HAND WASHING  All family and friends should wash their hands:  Before and after holding the baby  Before feeding the baby  After using the restroom or changing the baby's diaper    WOUND CARE  Ask your physician for additional care instructions. In general:  Episiotomy/Laceration  May use kan-spray bottle, witch hazel pads and dermaplast spray for comfort  Use kan-spray bottle after urinating to cleanse perineal area  To prevent burning during urination spray kan-water bottle on labial area   Pat perineal area dry until episiotomy/laceration is healed  Continue to use kan-bottle until bleeding stops as  needed  If have a 2nd degree laceration or greater, a Sitz bath can offer relief from soreness, burning, and inflammation   Sitz Bath   Sit in 6 inches of warm water and soak laceration as needed until the laceration heals    VAGINAL CARE AND BLEEDING  Nothing inside vagina for 6 weeks:   No sexual intercourse, tampons or douching  Bleeding may continue for 2-4 weeks. Amount and color may vary  Soaking 1 pad or more in an hour for several hours is considered heavy bleeding  Passing large egg sized blood clots can be concerning  If you feel like you have heavy bleeding or are having increasing amount of blood clots call your Obstetrician immediately  If you begin feeling faint upon standing, feeling sick to your stomach, have clammy skin, a really fast heartbeat, have chills, start feeling confused, dizzy, sleepy or weak, or feeling like you're going to faint call your Obstetrician immediately    HYPERTENSION   Preeclampsia or gestational hypertension are types of high blood pressure that only pregnant women can get. It is important for you to be aware of symptoms to seek early intervention and treatment. If you have any of these symptoms immediately call your Obstetrician    Vision changes or blurred vision   Severe headache or pain that is unrelieved with medication and will not go away  Persistent pain in upper abdomen or shoulder   Increased swelling of face, feet, or hands  Difficulty breathing or shortness of breath at rest  Urinating less than usual    URINATION AND BOWEL MOVEMENTS  Eating more fiber (bran cereal, fruits, and vegetables) and drinking plenty of fluids will help to avoid constipation  Urinary frequency and urgency after childbirth is normal  If you experience any urinary pain, burning or frequency call your provider    BIRTH CONTROL  It is possible to become pregnant at any time after delivery and while breastfeeding  Plan to discuss a method of birth control with your physician at your post  "delivery follow up visit    POSTPARTUM BLUES  During the first few days after birth, you may experience a sense of the \"blues\" which may include impatience, irritability or even crying. These feelings come and go quickly. However, as many as 1 in 10 women experience emotional symptoms known as postpartum depression.     POSTPARTUM DEPRESSION    May start as early as the second or third day after delivery or take several weeks or months to develop. Symptoms of \"blues\" are present, but are more intense: Crying spells; loss of appetite; feelings of hopelessness or loss of control; fear of touching the baby; over concern or no concern at all about the baby; little or no concern about your own appearance/caring for yourself; and/or inability to sleep or excessive sleeping. Contact your Obstetrician if you are experiencing any of these symptoms     PREVENTING SHAKEN BABY  If you are angry or stressed, PUT THE BABY IN THE CRIB, step away, take some deep breaths, and wait until you are calm to care for the baby. DO NOT SHAKE THE BABY. You are not alone, call a supporter for help.  [facility] 24/7 crisis call line (850-557-6048) or (1-655.633.1450)  You can also text them, text \"ANSWER\" (903211)  "

## 2022-11-09 NOTE — PROGRESS NOTES
Pt education and discharge instructions reviewed with pt and pt states understanding.  Pt states that all questions have been answered and denies any problems. Pt discharged home in stable condition with all belongings.

## 2023-03-16 PROBLEM — J45.909 ASTHMA: Status: ACTIVE | Noted: 2020-05-12

## 2023-03-16 RX ORDER — MEDROXYPROGESTERONE ACETATE 150 MG/ML
1 INJECTION, SUSPENSION INTRAMUSCULAR
COMMUNITY

## 2023-03-16 RX ORDER — ONDANSETRON 4 MG/1
TABLET, ORALLY DISINTEGRATING ORAL
COMMUNITY
End: 2023-03-17

## 2023-03-17 ENCOUNTER — OFFICE VISIT (OUTPATIENT)
Dept: MEDICAL GROUP | Facility: PHYSICIAN GROUP | Age: 23
End: 2023-03-17
Payer: COMMERCIAL

## 2023-03-17 VITALS
DIASTOLIC BLOOD PRESSURE: 68 MMHG | WEIGHT: 114 LBS | HEART RATE: 70 BPM | BODY MASS INDEX: 22.98 KG/M2 | SYSTOLIC BLOOD PRESSURE: 106 MMHG | RESPIRATION RATE: 20 BRPM | HEIGHT: 59 IN | OXYGEN SATURATION: 98 % | TEMPERATURE: 98.6 F

## 2023-03-17 DIAGNOSIS — R53.83 OTHER FATIGUE: ICD-10-CM

## 2023-03-17 DIAGNOSIS — Z13.6 SCREENING FOR CARDIOVASCULAR CONDITION: ICD-10-CM

## 2023-03-17 DIAGNOSIS — Z83.2 FAMILY HISTORY OF AUTOIMMUNE DISORDER: ICD-10-CM

## 2023-03-17 DIAGNOSIS — E55.9 VITAMIN D DEFICIENCY: ICD-10-CM

## 2023-03-17 DIAGNOSIS — R51.9 NONINTRACTABLE HEADACHE, UNSPECIFIED CHRONICITY PATTERN, UNSPECIFIED HEADACHE TYPE: ICD-10-CM

## 2023-03-17 DIAGNOSIS — J45.20 MILD INTERMITTENT ASTHMA WITHOUT COMPLICATION: ICD-10-CM

## 2023-03-17 PROCEDURE — 99204 OFFICE O/P NEW MOD 45 MIN: CPT | Performed by: PHYSICIAN ASSISTANT

## 2023-03-17 RX ORDER — ALBUTEROL SULFATE 90 UG/1
2 AEROSOL, METERED RESPIRATORY (INHALATION) EVERY 6 HOURS PRN
COMMUNITY

## 2023-03-17 ASSESSMENT — ENCOUNTER SYMPTOMS
SORE THROAT: 0
DIAPHORESIS: 0
COUGH: 0
VOMITING: 0
ABDOMINAL PAIN: 0
MYALGIAS: 0
BLURRED VISION: 0
BRUISES/BLEEDS EASILY: 0
FALLS: 0
PALPITATIONS: 0
DIARRHEA: 0
WEIGHT LOSS: 0
DEPRESSION: 0
CHILLS: 0
ORTHOPNEA: 0
SHORTNESS OF BREATH: 0
NAUSEA: 0
NERVOUS/ANXIOUS: 0
WEAKNESS: 0
DIZZINESS: 0
HEADACHES: 1
FEVER: 0

## 2023-03-17 ASSESSMENT — PATIENT HEALTH QUESTIONNAIRE - PHQ9: CLINICAL INTERPRETATION OF PHQ2 SCORE: 0

## 2023-03-17 NOTE — PROGRESS NOTES
Subjective:     CC: establish care    HPI:   Korina presents today with    Problem   Family History of Autoimmune Disorder    One sister with RA.  One sister with Sjogren's and RA     Vitamin D Deficiency    Chronic, controlled.  Takes a MVI, not a dedicated vit D supplement.     Nonintractable Headache    Chronic, uncontrolled.  Patient reports these headaches as a child, evaluated by neurology as a teenager.  She reports a normal MRI.  She has been evaluated by ophthalmology as well.  Headaches seem to go away for a little bit but returned during her most recent pregnancy, about 6 months ago or so.  Headache is most days of the week, right worse than left.  She has tenderness on the lateral aspects of her head, extending to the eyes.  There is tenderness to touch on the skin/scalp.  She does report some blurry vision in the right eye but ophthalmology did not find any abnormalities.  She is currently nursing and is not interested in medication at this time.  Will place a new neurology referral.     Asthma    Chronic, controlled.  Tolerates albuterol as needed.  Does not use it often.      (Spontaneous Vaginal Delivery) (Resolved)       Health Maintenance: Completed    ROS:  Review of Systems   Constitutional:  Negative for chills, diaphoresis, fever, malaise/fatigue and weight loss.   HENT:  Negative for hearing loss and sore throat.    Eyes:  Negative for blurred vision.   Respiratory:  Negative for cough and shortness of breath.    Cardiovascular:  Negative for chest pain, palpitations, orthopnea and leg swelling.   Gastrointestinal:  Negative for abdominal pain, diarrhea, nausea and vomiting.   Genitourinary:  Negative for dysuria, frequency, hematuria and urgency.   Musculoskeletal:  Negative for falls, joint pain and myalgias.   Skin:  Negative for rash.   Neurological:  Positive for headaches. Negative for dizziness and weakness.   Endo/Heme/Allergies:  Does not bruise/bleed easily.  "  Psychiatric/Behavioral:  Negative for depression. The patient is not nervous/anxious.      Objective:     Exam:  /68 (BP Location: Left arm, Patient Position: Sitting, BP Cuff Size: Adult)   Pulse 70   Temp 37 °C (98.6 °F) (Temporal)   Resp 20   Ht 1.499 m (4' 11\")   Wt 51.7 kg (114 lb)   SpO2 98%   Breastfeeding Yes   BMI 23.03 kg/m²  Body mass index is 23.03 kg/m².    Physical Exam  Constitutional:       Appearance: Normal appearance.   Eyes:      Extraocular Movements: Extraocular movements intact.      Conjunctiva/sclera: Conjunctivae normal.      Pupils: Pupils are equal, round, and reactive to light.   Cardiovascular:      Rate and Rhythm: Normal rate and regular rhythm.      Heart sounds: Normal heart sounds.   Pulmonary:      Effort: Pulmonary effort is normal.      Breath sounds: Normal breath sounds.   Abdominal:      General: Abdomen is flat. There is no distension.      Palpations: Abdomen is soft. There is no mass.      Tenderness: There is no abdominal tenderness. There is no guarding.      Hernia: No hernia is present.   Musculoskeletal:      Cervical back: Normal range of motion and neck supple.   Skin:     General: Skin is warm.      Comments: Rashes or skin abnormalities noted in the area of pain on the head and face.   Neurological:      General: No focal deficit present.      Mental Status: She is alert.   Psychiatric:         Mood and Affect: Mood normal.           Assessment & Plan:     22 y.o. female with the following -     1. Nonintractable headache, unspecified chronicity pattern, unspecified headache type  Chronic, uncontrolled.  Currently nursing. Not interested in medication at this time.  Referring to neurology.    - Referral to Neurology    2. Mild intermittent asthma without complication  Chronic, stable.  Continue albuterol as needed.    3. Family history of autoimmune disorder  Patient is asking for autoimmune testing.  She has 1 sister with RA and 1 sister with " Sjogren's and RA.    - PRAMOD IGG HANG W/RFLX TO PRAMOD IGG IFA; Future  - Sed Rate; Future  - CRP QUANTITIVE (NON-CARDIAC); Future  - RHEUMATOID ARTHRITIS FACTOR; Future  - SJOGREN'S ANTIBODIES; Future    4. Vitamin D deficiency  Chronic, control unknown.  Due to repeat labs.  Continue with the multivitamin.    5. Other fatigue  Chronic, stable.  Checking labs.    - CBC WITH DIFFERENTIAL; Future  - Comp Metabolic Panel; Future  - TSH WITH REFLEX TO FT4; Future  - VITAMIN D,25 HYDROXY (DEFICIENCY); Future    6. Screening for cardiovascular condition    - Lipid Profile; Future        Return if symptoms worsen or fail to improve.    Please note that this dictation was created using voice recognition software. I have made every reasonable attempt to correct obvious errors, but I expect that there are errors of grammar and possibly content that I did not discover before finalizing the note.

## 2023-03-22 ENCOUNTER — TELEPHONE (OUTPATIENT)
Dept: HEALTH INFORMATION MANAGEMENT | Facility: OTHER | Age: 23
End: 2023-03-22
Payer: COMMERCIAL

## 2023-05-04 ENCOUNTER — EH NON-PROVIDER (OUTPATIENT)
Dept: OCCUPATIONAL MEDICINE | Facility: CLINIC | Age: 23
End: 2023-05-04

## 2023-05-04 DIAGNOSIS — Z02.89 ENCOUNTER FOR OCCUPATIONAL HEALTH ASSESSMENT: ICD-10-CM

## 2023-05-04 PROCEDURE — 94375 RESPIRATORY FLOW VOLUME LOOP: CPT | Performed by: PREVENTIVE MEDICINE

## 2023-07-14 ENCOUNTER — HOSPITAL ENCOUNTER (OUTPATIENT)
Dept: LAB | Facility: MEDICAL CENTER | Age: 23
End: 2023-07-14
Attending: PHYSICIAN ASSISTANT
Payer: COMMERCIAL

## 2023-07-14 DIAGNOSIS — Z13.6 SCREENING FOR CARDIOVASCULAR CONDITION: ICD-10-CM

## 2023-07-14 DIAGNOSIS — Z83.2 FAMILY HISTORY OF AUTOIMMUNE DISORDER: ICD-10-CM

## 2023-07-14 DIAGNOSIS — R53.83 OTHER FATIGUE: ICD-10-CM

## 2023-07-14 LAB
25(OH)D3 SERPL-MCNC: 22 NG/ML (ref 30–100)
ALBUMIN SERPL BCP-MCNC: 4.8 G/DL (ref 3.2–4.9)
ALBUMIN/GLOB SERPL: 1.7 G/DL
ALP SERPL-CCNC: 96 U/L (ref 30–99)
ALT SERPL-CCNC: 30 U/L (ref 2–50)
ANION GAP SERPL CALC-SCNC: 14 MMOL/L (ref 7–16)
AST SERPL-CCNC: 21 U/L (ref 12–45)
BASOPHILS # BLD AUTO: 0.4 % (ref 0–1.8)
BASOPHILS # BLD: 0.04 K/UL (ref 0–0.12)
BILIRUB SERPL-MCNC: 0.4 MG/DL (ref 0.1–1.5)
BUN SERPL-MCNC: 18 MG/DL (ref 8–22)
CALCIUM ALBUM COR SERPL-MCNC: 9 MG/DL (ref 8.5–10.5)
CALCIUM SERPL-MCNC: 9.6 MG/DL (ref 8.5–10.5)
CHLORIDE SERPL-SCNC: 101 MMOL/L (ref 96–112)
CHOLEST SERPL-MCNC: 139 MG/DL (ref 100–199)
CO2 SERPL-SCNC: 20 MMOL/L (ref 20–33)
CREAT SERPL-MCNC: 0.61 MG/DL (ref 0.5–1.4)
CRP SERPL HS-MCNC: 0.65 MG/DL (ref 0–0.75)
EOSINOPHIL # BLD AUTO: 0.32 K/UL (ref 0–0.51)
EOSINOPHIL NFR BLD: 3.5 % (ref 0–6.9)
ERYTHROCYTE [DISTWIDTH] IN BLOOD BY AUTOMATED COUNT: 39.5 FL (ref 35.9–50)
ERYTHROCYTE [SEDIMENTATION RATE] IN BLOOD BY WESTERGREN METHOD: 14 MM/HOUR (ref 0–25)
FASTING STATUS PATIENT QL REPORTED: NORMAL
GFR SERPLBLD CREATININE-BSD FMLA CKD-EPI: 127 ML/MIN/1.73 M 2
GLOBULIN SER CALC-MCNC: 2.9 G/DL (ref 1.9–3.5)
GLUCOSE SERPL-MCNC: 83 MG/DL (ref 65–99)
HCT VFR BLD AUTO: 39.4 % (ref 37–47)
HDLC SERPL-MCNC: 38 MG/DL
HGB BLD-MCNC: 13.5 G/DL (ref 12–16)
IMM GRANULOCYTES # BLD AUTO: 0.02 K/UL (ref 0–0.11)
IMM GRANULOCYTES NFR BLD AUTO: 0.2 % (ref 0–0.9)
LDLC SERPL CALC-MCNC: 91 MG/DL
LYMPHOCYTES # BLD AUTO: 3.48 K/UL (ref 1–4.8)
LYMPHOCYTES NFR BLD: 38.3 % (ref 22–41)
MCH RBC QN AUTO: 29.6 PG (ref 27–33)
MCHC RBC AUTO-ENTMCNC: 34.3 G/DL (ref 32.2–35.5)
MCV RBC AUTO: 86.4 FL (ref 81.4–97.8)
MONOCYTES # BLD AUTO: 0.54 K/UL (ref 0–0.85)
MONOCYTES NFR BLD AUTO: 5.9 % (ref 0–13.4)
NEUTROPHILS # BLD AUTO: 4.69 K/UL (ref 1.82–7.42)
NEUTROPHILS NFR BLD: 51.7 % (ref 44–72)
NRBC # BLD AUTO: 0 K/UL
NRBC BLD-RTO: 0 /100 WBC (ref 0–0.2)
PLATELET # BLD AUTO: 392 K/UL (ref 164–446)
PMV BLD AUTO: 9.8 FL (ref 9–12.9)
POTASSIUM SERPL-SCNC: 4.4 MMOL/L (ref 3.6–5.5)
PROT SERPL-MCNC: 7.7 G/DL (ref 6–8.2)
RBC # BLD AUTO: 4.56 M/UL (ref 4.2–5.4)
RHEUMATOID FACT SER IA-ACNC: <10 IU/ML (ref 0–14)
SODIUM SERPL-SCNC: 135 MMOL/L (ref 135–145)
TRIGL SERPL-MCNC: 50 MG/DL (ref 0–149)
TSH SERPL DL<=0.005 MIU/L-ACNC: 1.52 UIU/ML (ref 0.38–5.33)
WBC # BLD AUTO: 9.1 K/UL (ref 4.8–10.8)

## 2023-07-14 PROCEDURE — 85652 RBC SED RATE AUTOMATED: CPT

## 2023-07-14 PROCEDURE — 82306 VITAMIN D 25 HYDROXY: CPT

## 2023-07-14 PROCEDURE — 85025 COMPLETE CBC W/AUTO DIFF WBC: CPT

## 2023-07-14 PROCEDURE — 84443 ASSAY THYROID STIM HORMONE: CPT

## 2023-07-14 PROCEDURE — 86431 RHEUMATOID FACTOR QUANT: CPT

## 2023-07-14 PROCEDURE — 86038 ANTINUCLEAR ANTIBODIES: CPT

## 2023-07-14 PROCEDURE — 36415 COLL VENOUS BLD VENIPUNCTURE: CPT

## 2023-07-14 PROCEDURE — 80061 LIPID PANEL: CPT

## 2023-07-14 PROCEDURE — 86235 NUCLEAR ANTIGEN ANTIBODY: CPT

## 2023-07-14 PROCEDURE — 80053 COMPREHEN METABOLIC PANEL: CPT

## 2023-07-14 PROCEDURE — 86140 C-REACTIVE PROTEIN: CPT

## 2023-07-15 LAB
ENA SS-B IGG SER IA-ACNC: 1 AU/ML (ref 0–40)
NUCLEAR IGG SER QL IA: NORMAL
SSA52 R0ENA AB IGG Q0420: 4 AU/ML (ref 0–40)
SSA60 R0ENA AB IGG Q0419: 4 AU/ML (ref 0–40)

## 2023-09-18 ENCOUNTER — OFFICE VISIT (OUTPATIENT)
Dept: NEUROLOGY | Facility: MEDICAL CENTER | Age: 23
End: 2023-09-18
Attending: PSYCHIATRY & NEUROLOGY
Payer: COMMERCIAL

## 2023-09-18 VITALS
SYSTOLIC BLOOD PRESSURE: 118 MMHG | HEART RATE: 70 BPM | BODY MASS INDEX: 24.53 KG/M2 | DIASTOLIC BLOOD PRESSURE: 78 MMHG | HEIGHT: 59 IN | OXYGEN SATURATION: 99 % | TEMPERATURE: 98.5 F | WEIGHT: 121.69 LBS

## 2023-09-18 DIAGNOSIS — G43.109 MIGRAINE WITH AURA AND WITHOUT STATUS MIGRAINOSUS, NOT INTRACTABLE: Primary | ICD-10-CM

## 2023-09-18 PROCEDURE — 3074F SYST BP LT 130 MM HG: CPT | Performed by: PSYCHIATRY & NEUROLOGY

## 2023-09-18 PROCEDURE — 99211 OFF/OP EST MAY X REQ PHY/QHP: CPT | Performed by: PSYCHIATRY & NEUROLOGY

## 2023-09-18 PROCEDURE — 99205 OFFICE O/P NEW HI 60 MIN: CPT | Performed by: PSYCHIATRY & NEUROLOGY

## 2023-09-18 PROCEDURE — 3078F DIAST BP <80 MM HG: CPT | Performed by: PSYCHIATRY & NEUROLOGY

## 2023-09-18 ASSESSMENT — FIBROSIS 4 INDEX: FIB4 SCORE: 0.24

## 2023-09-18 NOTE — PATIENT INSTRUCTIONS
Try supplementing:  - magnesium: 400-600 mg once or 200-300 mg twice daily  - riboflavin (vitamin B2): 400 mg once daily  - coenzyme Q10: 300 mg daily

## 2023-09-18 NOTE — PROGRESS NOTES
[facility]  GENERAL NEUROLOGY  NEW PATIENT VISIT    Referral source: Elizabeth Kirkland PA-C    CC: nonintractable headache, unspecified chronicity patter, unspecified headache type    HISTORY OF ILLNESS:  Korina Potts is a 23 y.o. female with a history most notable for headache.  Today, Korina provided the following  headache history:    Headache description history:  A sharp pain in the right (sometimes the left) eye that would trigger a headache. She then will have blurred vision with pain that can increase to where she would have numbness in the left mouth down to left arm and fingers.The pain would radiate around her head and sometimes down her neck. She does report scalp pain that when touched that can cause a headache.     Korina states that she feels that her migraines increased 1-2 months after delivery of her  2nd child approximately December- January 2022/2023.  She denies having these migraines after her first child or any time during either pregnancy. She is currently on depo-provera shot and stopped breastfeeding in May 2023.She reports that initially she had  migraines every single day since December/ January until July when it would skip a few days until it stopped approximately 3 weeks ago. The migraines would usually fluctuate where she had a migraine for 1 hour then would have a lingering pain. She reports that she could have multiple migraines a day with a lingering headache that would continue until the next migraine. She also reports being incapacitated with severe migraine symptoms for 10 days a month at least 4 hours each occurrence.    Currently she states that she is having scalp pain that can trigger a headache when she touches either bilateral parietal region, puts her hair up, or washes her hair. She also gets shooting pain down her right or left parietal region radiating to her eye that is a 7/10 in intensity but will only last for approximately 2-3 minutes but she can have  about 2-3 episodes of this shooting pain a day. Korina also reports a new light sensitivity when she has eye exams that cause dizziness that will resolve in a few minutes.       The following is a summary of headache symptoms, presented in my standard format:    Family History: none  Age at onset (years): 14 years old  Location: Primarily right orbit can originate in the left  Radiation: can radiate full head and sometimes down the neck  Frequency: 2-3 small shooting sensations daily, migraines were everyday for months Dec 22/Jan 23 until August 23  Duration: shooting pain- 2-3 minutes, migraine pain 1 hour an episode  Headache Days/Month: 30/30 (everyday shooting sensation, everyday migraine prior to July)  Quality: shooting pain- sharp, migraine- squeezing, pressure, throbbing  Intensity: shooting pain- 7/10, migraine pain-10/10  Aura: none  Photophobia/Phonophobia/Nausea/Vomiting: intermittent (usually the sun), yes, intermittent, no  Provoked by Physical Activity?: no  Triggers: touching the parietal region of the bilateral head  Associated Symptoms: left mouth  numbness and left arm numbness/tingling  Autonomic Signs (such as ptosis, miosis, conjunctival injection, rhinorrhea, increased lacrimation): none currently. Remote right eye swelling once after swimming with a migraine  Head Trauma: none  Association with Menses: none  ED Visits: none  Hospitalizations: none  Missed Work Days (nurse): none  Sleep (hours/night): 4-6 hours  Caffeine Intake: approximately 2 cokes daily  Hydration: yes  Nutrition: skips breakfast  Exercise: no  Analgesic Overuse: naproxen 660 mg on occasion with the migraines    Current Medication Regimen:  - naproxen 660 mg     Medications Tried: Response  Preventive:  - none    Rescue:  - none    Medications Not Tried:  -     MEDICAL AND SURGICAL HISTORY:  No past medical history on file.  Past Surgical History:   Procedure Laterality Date    APPENDECTOMY       MEDICATIONS:  Current  Outpatient Medications   Medication Sig    albuterol 108 (90 Base) MCG/ACT Aero Soln inhalation aerosol Inhale 2 Puffs every 6 hours as needed for Shortness of Breath.    medroxyPROGESTERone (DEPO-PROVERA) 150 MG/ML Suspension 1 mL.     SOCIAL HISTORY:  Social History     Tobacco Use    Smoking status: Never    Smokeless tobacco: Never   Substance Use Topics    Alcohol use: Never     Social History     Social History Narrative    Not on file     FAMILY HISTORY:  Family History   Problem Relation Age of Onset    Hypertension Mother     Diabetes Father     Heart Disease Maternal Grandfather     Diabetes Paternal Grandmother     Diabetes Paternal Grandfather     Cancer Neg Hx     Ovarian Cancer Neg Hx     Tubal Cancer Neg Hx     Peritoneal Cancer Neg Hx     Colorectal Cancer Neg Hx     Breast Cancer Neg Hx     Bilateral Breast Cancer Neg Hx     Hyperlipidemia Neg Hx     Stroke Neg Hx    Mother's uncle had AVM and passed    REVIEW OF SYSTEMS:  Dizziness with directed light to her eye, shooting pain in scalp to right eye (sometimes left),  She has scalp pain, she has neck tightness and pain. She currently denies nausea, vertigo,vomiting, or balance issues.    PHYSICAL EXAM:  General/Medical:  Korina presents well dressed and clean. She is in no apparent acute distress. She has allodynia of her head with touch, pulling her hair back or washing her hair. She has no jaw rigidity or claudication. She has full range of motion of her neck but reports lingering neck pain when she turns her head to the right or left. No malar rash. No upper or lower extremity bruising, rash, or edema. She is able to rise from the chair without assistance or using her arms. She has a normal arm swing with ambulation. She is able to flex her head to chest without rigidity or pain.  Neuro:  MENTAL STATUS: awake and alert; no deficits of speech or language; oriented to person, place, and time; affect was appropriate to situation. No dysarthria or  hypophonia.     CRANIAL NERVES:    II:  fields: intact to confrontation, pupils: 4/4 to 3/3 without a relative afferent pupillary defect, discs: sharp    III/IV/VI: intact without nystagmus    V: facial sensation: symmetric to light touch    VII: facial expression: symmetric    VIII: hearing: intact to finger rub    IX/X: palate: elevates symmetrically    XI: shoulder shrug: symmetric    XII: tongue: midline    MOTOR:  - bulk: normal throughout  - tone: normal throughout  Upper Extremity Strength  (R/L)    5/5   Elbow flexion 5/5   Elbow extension 5/5   Shoulder abduction 5/5     Lower Extremity Strength  (R/L)   Hip flexion 5/5   Knee extension 5/5   Knee flexion 5/5   Ankle plantarflexion 5/5   Ankle dorsiflexion 5/5     - heel-walk: intact  - toe-walk: intact  - pronator drift: absent  - abnormal movements: none    SENSATION:  - light touch: intact  - vibration :intact  - temperature: intact  - Romberg: absent    COORDINATION:  - finger to nose: normal, no ataxia on exam  - finger tapping: rapid and accurate, bilaterally  - Foot tapping: normal frequency and amplitude    REFLEXES:  Reflex Right Left   BR 2+ 2+   Biceps 2+ 2+   Triceps 2+ 2+   Patellae 2+ 2+   Achilles 2+ 2+   Toes down down     GAIT:  - narrow base and normal  - heel-walk: intact  - toe-walk: intact  - tandem gait: intact    REVIEW OF IMAGING STUDIES: I reviewed the following studies:  MRI Brain:  N/A    REVIEW OF LABORATORY STUDIES:    Previously ordered labs from her primary provider were reviewed and normal:  7/14/2023 TSH WNL  7/14/23  CBC WNL  7/14/23 CMP  WNL      ASSESSMENT & PLAN:    1. Migraine with aura and without status migrainosus, not intractable  Korina states that prior to her migraines stopping three weeks ago she was having 30/30 days of migraine symptoms having at least 10 days with 4 hours each day that would incapacitate her. She does meet criteria for chronic migraines, however, Korina feels that since her migraines have  stopped three weeks ago she is hesitant to start a preventative medication. We did discuss that her shooting pains and scalp tenderness could be residual symptoms from her migraines and that a preventive medication may help resolve the symptoms she is having, but that without medication the symptoms should resolve on its own with time. We discussed different preventative medications such as amitriptyline, Topamax, propranolol, and a calcium channel blocker that could be beneficial. Korina will contact the office via [employer] if there is any change and she would like to start a preventative medication. In the meantime, we will be sending Korina home with some samples of Nurtec for an abortive therapy in the event she again has a migraine. Medication administration, side effects, and questions were answered. Korina will also be given information about OTC supplements for migraine prevention as well. Korina will return in 6 months to discuss migraines and her current medication regimen. She will also contact the office via [employer] with any updates, questions, or concerns before her next appointment.    - Nurtec-ODT 75 mg. 1 tab at headache onset, repeat in 24 hours if necessary.  Dispense: 4 Each; Refill: 0      Try supplementing:  - magnesium: 400-600 mg once or 200-300 mg twice daily  - riboflavin (vitamin B2): 400 mg once daily  - coenzyme Q10: 300 mg daily     Signed: NO Galvez    Neurology Attending Physician Attestation:    Patient seen and evaluated with BETH Silva.  I reviewed and agree with the history, exam findings, impression, and plan of care recorded in the initial consultation note.  I participated in key elements of management.    Signed: Jayesh Forman M.D. at 4:50 PM on 09/18/23    BILLING DOCUMENTATION:   I spent 60 minutes with this patient. This includes time with chart review, pre-charting, records review, discussions with other healthcare providers, and documentation.  This also includes face-to-face time with the patient for exam review, discussion, and plan of care.

## 2023-10-14 ENCOUNTER — IMMUNIZATION (OUTPATIENT)
Dept: OCCUPATIONAL MEDICINE | Facility: CLINIC | Age: 23
End: 2023-10-14

## 2023-10-14 DIAGNOSIS — Z23 NEED FOR VACCINATION: Primary | ICD-10-CM

## 2023-10-14 PROCEDURE — 90686 IIV4 VACC NO PRSV 0.5 ML IM: CPT | Performed by: PREVENTIVE MEDICINE

## 2023-11-07 ENCOUNTER — APPOINTMENT (OUTPATIENT)
Dept: MEDICAL GROUP | Facility: PHYSICIAN GROUP | Age: 23
End: 2023-11-07
Payer: COMMERCIAL

## 2023-12-01 DIAGNOSIS — G43.109 MIGRAINE WITH AURA AND WITHOUT STATUS MIGRAINOSUS, NOT INTRACTABLE: ICD-10-CM

## 2023-12-01 RX ORDER — FROVATRIPTAN SUCCINATE 2.5 MG/1
TABLET, FILM COATED ORAL
Qty: 9 TABLET | Refills: 3 | Status: SHIPPED
Start: 2023-12-01 | End: 2024-02-26

## 2023-12-01 RX ORDER — NORTRIPTYLINE HYDROCHLORIDE 10 MG/1
CAPSULE ORAL
Qty: 290 CAPSULE | Refills: 0 | Status: SHIPPED | OUTPATIENT
Start: 2023-12-01 | End: 2024-02-25

## 2024-02-26 ENCOUNTER — OFFICE VISIT (OUTPATIENT)
Dept: MEDICAL GROUP | Facility: PHYSICIAN GROUP | Age: 24
End: 2024-02-26
Payer: COMMERCIAL

## 2024-02-26 VITALS
WEIGHT: 122.2 LBS | OXYGEN SATURATION: 95 % | SYSTOLIC BLOOD PRESSURE: 96 MMHG | HEIGHT: 59 IN | RESPIRATION RATE: 16 BRPM | HEART RATE: 75 BPM | DIASTOLIC BLOOD PRESSURE: 64 MMHG | BODY MASS INDEX: 24.64 KG/M2 | TEMPERATURE: 97.4 F

## 2024-02-26 DIAGNOSIS — G43.109 MIGRAINE WITH AURA AND WITHOUT STATUS MIGRAINOSUS, NOT INTRACTABLE: ICD-10-CM

## 2024-02-26 DIAGNOSIS — R51.9 NONINTRACTABLE HEADACHE, UNSPECIFIED CHRONICITY PATTERN, UNSPECIFIED HEADACHE TYPE: ICD-10-CM

## 2024-02-26 PROCEDURE — 3078F DIAST BP <80 MM HG: CPT | Performed by: PHYSICIAN ASSISTANT

## 2024-02-26 PROCEDURE — 99214 OFFICE O/P EST MOD 30 MIN: CPT | Performed by: PHYSICIAN ASSISTANT

## 2024-02-26 PROCEDURE — 3074F SYST BP LT 130 MM HG: CPT | Performed by: PHYSICIAN ASSISTANT

## 2024-02-26 RX ORDER — ACETAMINOPHEN AND CODEINE PHOSPHATE 120; 12 MG/5ML; MG/5ML
SOLUTION ORAL
COMMUNITY
Start: 2024-02-08

## 2024-02-26 RX ORDER — FROVATRIPTAN SUCCINATE 2.5 MG/1
TABLET, FILM COATED ORAL
Qty: 18 TABLET | Refills: 1 | Status: SHIPPED | OUTPATIENT
Start: 2024-02-26 | End: 2024-03-18

## 2024-02-26 ASSESSMENT — ENCOUNTER SYMPTOMS
CHILLS: 0
FEVER: 0
SHORTNESS OF BREATH: 0

## 2024-02-26 ASSESSMENT — FIBROSIS 4 INDEX: FIB4 SCORE: 0.24

## 2024-02-26 NOTE — PROGRESS NOTES
SUBJECTIVE:     CC: headache    HPI:   Korina presents today with the following:    ASSESSMENT & PLAN by Problem:       Problem List Items Addressed This Visit       Migraine with aura and without status migrainosus, not intractable     Chronic, uncontrolled.  Increasing frovatriptan to 5 mg as needed for migraine.  Follow-up with neurology in March as scheduled.         Relevant Medications    Frovatriptan Succinate 2.5 MG Tab    RESOLVED: Nonintractable headache         Return if symptoms worsen or fail to improve.        HPI:     Problem   Migraine With Aura and Without Status Migrainosus, Not Intractable    Chronic, uncontrolled.  Has been evaluated by neurology.  Was given nortriptyline; Increased to 50mg without change in symptoms.  Nurtec as needed didn't help.  Tried Frova 2.5mg; didn't help.  Has follow up with neurology in March.  Will increase frovatriptan until she is seen in March.      9/18/2023 neurology evaluation:  1. Migraine with aura and without status migrainosus, not intractable  Korina states that prior to her migraines stopping three weeks ago she was having 30/30 days of migraine symptoms having at least 10 days with 4 hours each day that would incapacitate her. She does meet criteria for chronic migraines, however, Korina feels that since her migraines have stopped three weeks ago she is hesitant to start a preventative medication. We did discuss that her shooting pains and scalp tenderness could be residual symptoms from her migraines and that a preventive medication may help resolve the symptoms she is having, but that without medication the symptoms should resolve on its own with time. We discussed different preventative medications such as amitriptyline, Topamax, propranolol, and a calcium channel blocker that could be beneficial. Korina will contact the office via [employer] if there is any change and she would like to start a preventative medication. In the meantime, we will be sending Korina  home with some samples of Nurtec for an abortive therapy in the event she again has a migraine. Medication administration, side effects, and questions were answered. Korina will also be given information about OTC supplements for migraine prevention as well. Korina will return in 6 months to discuss migraines and her current medication regimen. She will also contact the office via [employer] with any updates, questions, or concerns before her next appointment.     - Nurtec-ODT 75 mg. 1 tab at headache onset, repeat in 24 hours if necessary.  Dispense: 4 Each; Refill: 0       Try supplementing:  - magnesium: 400-600 mg once or 200-300 mg twice daily  - riboflavin (vitamin B2): 400 mg once daily  - coenzyme Q10: 300 mg daily      Nonintractable Headache (Resolved)    Chronic, uncontrolled.  Patient reports these headaches as a child, evaluated by neurology as a teenager.  She reports a normal MRI.  She has been evaluated by ophthalmology as well.  Headaches seem to go away for a little bit but returned during her most recent pregnancy, about 6 months ago or so.  Headache is most days of the week, right worse than left.  She has tenderness on the lateral aspects of her head, extending to the eyes.  There is tenderness to touch on the skin/scalp.  She does report some blurry vision in the right eye but ophthalmology did not find any abnormalities.  She is currently nursing and is not interested in medication at this time.  Will place a new neurology referral.    9/18/2023 neurology evaluation:  1. Migraine with aura and without status migrainosus, not intractable  Korina states that prior to her migraines stopping three weeks ago she was having 30/30 days of migraine symptoms having at least 10 days with 4 hours each day that would incapacitate her. She does meet criteria for chronic migraines, however, Korina feels that since her migraines have stopped three weeks ago she is hesitant to start a preventative medication. We did  "discuss that her shooting pains and scalp tenderness could be residual symptoms from her migraines and that a preventive medication may help resolve the symptoms she is having, but that without medication the symptoms should resolve on its own with time. We discussed different preventative medications such as amitriptyline, Topamax, propranolol, and a calcium channel blocker that could be beneficial. Korina will contact the office via [employer] if there is any change and she would like to start a preventative medication. In the meantime, we will be sending Korina home with some samples of Nurtec for an abortive therapy in the event she again has a migraine. Medication administration, side effects, and questions were answered. Korina will also be given information about OTC supplements for migraine prevention as well. Korina will return in 6 months to discuss migraines and her current medication regimen. She will also contact the office via [facility] with any updates, questions, or concerns before her next appointment.     - Nurtec-ODT 75 mg. 1 tab at headache onset, repeat in 24 hours if necessary.  Dispense: 4 Each; Refill: 0       Try supplementing:  - magnesium: 400-600 mg once or 200-300 mg twice daily  - riboflavin (vitamin B2): 400 mg once daily  - coenzyme Q10: 300 mg daily                 ROS:  Review of Systems   Constitutional:  Negative for chills and fever.   Respiratory:  Negative for shortness of breath.    Cardiovascular:  Negative for chest pain.       OBJECTIVE:     Exam:  BP 96/64 (BP Location: Right arm, Patient Position: Sitting, BP Cuff Size: Adult)   Pulse 75   Temp 36.3 °C (97.4 °F) (Temporal)   Resp 16   Ht 1.499 m (4' 11\")   Wt 55.4 kg (122 lb 3.2 oz)   SpO2 95%   Breastfeeding No   BMI 24.68 kg/m²  Body mass index is 24.68 kg/m².    Physical Exam  Vitals reviewed.   Constitutional:       General: She is not in acute distress.     Appearance: Normal appearance.   Pulmonary:      Effort: " Pulmonary effort is normal.   Neurological:      General: No focal deficit present.      Mental Status: She is alert.   Psychiatric:         Mood and Affect: Mood normal.         Behavior: Behavior normal.         Judgment: Judgment normal.           Please note that this dictation was created using voice recognition software. I have made every reasonable attempt to correct obvious errors, but I expect that there are errors of grammar and possibly content that I did not discover before finalizing the note.

## 2024-02-26 NOTE — ASSESSMENT & PLAN NOTE
Chronic, uncontrolled.  Increasing frovatriptan to 5 mg as needed for migraine.  Follow-up with neurology in March as scheduled.

## 2024-03-08 ENCOUNTER — APPOINTMENT (OUTPATIENT)
Dept: RADIOLOGY | Facility: MEDICAL CENTER | Age: 24
End: 2024-03-08
Attending: EMERGENCY MEDICINE
Payer: COMMERCIAL

## 2024-03-08 ENCOUNTER — OFFICE VISIT (OUTPATIENT)
Dept: URGENT CARE | Facility: PHYSICIAN GROUP | Age: 24
End: 2024-03-08
Payer: COMMERCIAL

## 2024-03-08 ENCOUNTER — HOSPITAL ENCOUNTER (EMERGENCY)
Facility: MEDICAL CENTER | Age: 24
End: 2024-03-08
Attending: EMERGENCY MEDICINE
Payer: COMMERCIAL

## 2024-03-08 VITALS
WEIGHT: 122.8 LBS | HEIGHT: 59 IN | DIASTOLIC BLOOD PRESSURE: 56 MMHG | BODY MASS INDEX: 24.76 KG/M2 | SYSTOLIC BLOOD PRESSURE: 98 MMHG | HEART RATE: 77 BPM | TEMPERATURE: 96.9 F | RESPIRATION RATE: 16 BRPM | OXYGEN SATURATION: 100 %

## 2024-03-08 VITALS
HEIGHT: 59 IN | DIASTOLIC BLOOD PRESSURE: 71 MMHG | TEMPERATURE: 98.6 F | SYSTOLIC BLOOD PRESSURE: 123 MMHG | OXYGEN SATURATION: 97 % | WEIGHT: 124.12 LBS | BODY MASS INDEX: 25.02 KG/M2 | HEART RATE: 76 BPM | RESPIRATION RATE: 18 BRPM

## 2024-03-08 DIAGNOSIS — G43.909 MIGRAINE WITHOUT STATUS MIGRAINOSUS, NOT INTRACTABLE, UNSPECIFIED MIGRAINE TYPE: ICD-10-CM

## 2024-03-08 DIAGNOSIS — G43.109 MIGRAINE WITH AURA AND WITHOUT STATUS MIGRAINOSUS, NOT INTRACTABLE: ICD-10-CM

## 2024-03-08 DIAGNOSIS — R55 NEAR SYNCOPE: ICD-10-CM

## 2024-03-08 DIAGNOSIS — H53.8 BLURRED VISION: ICD-10-CM

## 2024-03-08 LAB
ALBUMIN SERPL BCP-MCNC: 4.7 G/DL (ref 3.2–4.9)
ALBUMIN/GLOB SERPL: 1.5 G/DL
ALP SERPL-CCNC: 92 U/L (ref 30–99)
ALT SERPL-CCNC: 13 U/L (ref 2–50)
ANION GAP SERPL CALC-SCNC: 11 MMOL/L (ref 7–16)
APPEARANCE UR: CLEAR
AST SERPL-CCNC: 15 U/L (ref 12–45)
BACTERIA #/AREA URNS HPF: ABNORMAL /HPF
BASOPHILS # BLD AUTO: 0.5 % (ref 0–1.8)
BASOPHILS # BLD: 0.04 K/UL (ref 0–0.12)
BILIRUB SERPL-MCNC: 0.4 MG/DL (ref 0.1–1.5)
BILIRUB UR QL STRIP.AUTO: NEGATIVE
BUN SERPL-MCNC: 10 MG/DL (ref 8–22)
CALCIUM ALBUM COR SERPL-MCNC: 8.3 MG/DL (ref 8.5–10.5)
CALCIUM SERPL-MCNC: 8.9 MG/DL (ref 8.4–10.2)
CHLORIDE SERPL-SCNC: 103 MMOL/L (ref 96–112)
CO2 SERPL-SCNC: 23 MMOL/L (ref 20–33)
COLOR UR: YELLOW
CREAT SERPL-MCNC: 0.59 MG/DL (ref 0.5–1.4)
EKG IMPRESSION: NORMAL
EOSINOPHIL # BLD AUTO: 0.15 K/UL (ref 0–0.51)
EOSINOPHIL NFR BLD: 1.9 % (ref 0–6.9)
EPI CELLS #/AREA URNS HPF: ABNORMAL /HPF
ERYTHROCYTE [DISTWIDTH] IN BLOOD BY AUTOMATED COUNT: 41.1 FL (ref 35.9–50)
GFR SERPLBLD CREATININE-BSD FMLA CKD-EPI: 128 ML/MIN/1.73 M 2
GLOBULIN SER CALC-MCNC: 3.2 G/DL (ref 1.9–3.5)
GLUCOSE SERPL-MCNC: 77 MG/DL (ref 65–99)
GLUCOSE UR STRIP.AUTO-MCNC: NEGATIVE MG/DL
HCG SERPL QL: NEGATIVE
HCT VFR BLD AUTO: 42.6 % (ref 37–47)
HGB BLD-MCNC: 14.6 G/DL (ref 12–16)
IMM GRANULOCYTES # BLD AUTO: 0.02 K/UL (ref 0–0.11)
IMM GRANULOCYTES NFR BLD AUTO: 0.3 % (ref 0–0.9)
KETONES UR STRIP.AUTO-MCNC: 15 MG/DL
LEUKOCYTE ESTERASE UR QL STRIP.AUTO: ABNORMAL
LYMPHOCYTES # BLD AUTO: 2.28 K/UL (ref 1–4.8)
LYMPHOCYTES NFR BLD: 29.6 % (ref 22–41)
MCH RBC QN AUTO: 30.4 PG (ref 27–33)
MCHC RBC AUTO-ENTMCNC: 34.3 G/DL (ref 32.2–35.5)
MCV RBC AUTO: 88.8 FL (ref 81.4–97.8)
MICRO URNS: ABNORMAL
MONOCYTES # BLD AUTO: 0.37 K/UL (ref 0–0.85)
MONOCYTES NFR BLD AUTO: 4.8 % (ref 0–13.4)
MUCOUS THREADS #/AREA URNS HPF: ABNORMAL /HPF
NEUTROPHILS # BLD AUTO: 4.84 K/UL (ref 1.82–7.42)
NEUTROPHILS NFR BLD: 62.9 % (ref 44–72)
NITRITE UR QL STRIP.AUTO: NEGATIVE
NRBC # BLD AUTO: 0 K/UL
NRBC BLD-RTO: 0 /100 WBC (ref 0–0.2)
PH UR STRIP.AUTO: 6.5 [PH] (ref 5–8)
PLATELET # BLD AUTO: 416 K/UL (ref 164–446)
PMV BLD AUTO: 9.1 FL (ref 9–12.9)
POTASSIUM SERPL-SCNC: 3.9 MMOL/L (ref 3.6–5.5)
PROT SERPL-MCNC: 7.9 G/DL (ref 6–8.2)
PROT UR QL STRIP: NEGATIVE MG/DL
RBC # BLD AUTO: 4.8 M/UL (ref 4.2–5.4)
RBC # URNS HPF: ABNORMAL /HPF
RBC UR QL AUTO: ABNORMAL
SODIUM SERPL-SCNC: 137 MMOL/L (ref 135–145)
SP GR UR STRIP.AUTO: 1.01
TROPONIN T SERPL-MCNC: <6 NG/L (ref 6–19)
UNIDENT CRYS URNS QL MICRO: ABNORMAL /HPF
WBC # BLD AUTO: 7.7 K/UL (ref 4.8–10.8)
WBC #/AREA URNS HPF: ABNORMAL /HPF

## 2024-03-08 PROCEDURE — 3078F DIAST BP <80 MM HG: CPT

## 2024-03-08 PROCEDURE — 36415 COLL VENOUS BLD VENIPUNCTURE: CPT

## 2024-03-08 PROCEDURE — 700105 HCHG RX REV CODE 258: Performed by: EMERGENCY MEDICINE

## 2024-03-08 PROCEDURE — 87086 URINE CULTURE/COLONY COUNT: CPT

## 2024-03-08 PROCEDURE — 99215 OFFICE O/P EST HI 40 MIN: CPT

## 2024-03-08 PROCEDURE — 80053 COMPREHEN METABOLIC PANEL: CPT

## 2024-03-08 PROCEDURE — 87077 CULTURE AEROBIC IDENTIFY: CPT

## 2024-03-08 PROCEDURE — 85025 COMPLETE CBC W/AUTO DIFF WBC: CPT

## 2024-03-08 PROCEDURE — 84484 ASSAY OF TROPONIN QUANT: CPT

## 2024-03-08 PROCEDURE — 84703 CHORIONIC GONADOTROPIN ASSAY: CPT

## 2024-03-08 PROCEDURE — 96375 TX/PRO/DX INJ NEW DRUG ADDON: CPT

## 2024-03-08 PROCEDURE — 96374 THER/PROPH/DIAG INJ IV PUSH: CPT

## 2024-03-08 PROCEDURE — 70498 CT ANGIOGRAPHY NECK: CPT

## 2024-03-08 PROCEDURE — 93005 ELECTROCARDIOGRAM TRACING: CPT | Performed by: EMERGENCY MEDICINE

## 2024-03-08 PROCEDURE — 700111 HCHG RX REV CODE 636 W/ 250 OVERRIDE (IP): Mod: JZ | Performed by: EMERGENCY MEDICINE

## 2024-03-08 PROCEDURE — 3074F SYST BP LT 130 MM HG: CPT

## 2024-03-08 PROCEDURE — 81001 URINALYSIS AUTO W/SCOPE: CPT

## 2024-03-08 PROCEDURE — 71045 X-RAY EXAM CHEST 1 VIEW: CPT

## 2024-03-08 PROCEDURE — 70496 CT ANGIOGRAPHY HEAD: CPT

## 2024-03-08 PROCEDURE — 99285 EMERGENCY DEPT VISIT HI MDM: CPT

## 2024-03-08 PROCEDURE — 700117 HCHG RX CONTRAST REV CODE 255: Performed by: EMERGENCY MEDICINE

## 2024-03-08 RX ORDER — SODIUM CHLORIDE 9 MG/ML
1000 INJECTION, SOLUTION INTRAVENOUS ONCE
Status: COMPLETED | OUTPATIENT
Start: 2024-03-08 | End: 2024-03-08

## 2024-03-08 RX ORDER — KETOROLAC TROMETHAMINE 15 MG/ML
INJECTION, SOLUTION INTRAMUSCULAR; INTRAVENOUS
Status: DISCONTINUED
Start: 2024-03-08 | End: 2024-03-08 | Stop reason: HOSPADM

## 2024-03-08 RX ORDER — DIPHENHYDRAMINE HYDROCHLORIDE 50 MG/ML
12.5 INJECTION INTRAMUSCULAR; INTRAVENOUS ONCE
Status: COMPLETED | OUTPATIENT
Start: 2024-03-08 | End: 2024-03-08

## 2024-03-08 RX ORDER — PROMETHAZINE HYDROCHLORIDE 25 MG/1
25 TABLET ORAL EVERY 8 HOURS PRN
Qty: 6 TABLET | Refills: 0 | Status: SHIPPED | OUTPATIENT
Start: 2024-03-08 | End: 2024-03-10

## 2024-03-08 RX ORDER — METOCLOPRAMIDE HYDROCHLORIDE 5 MG/ML
10 INJECTION INTRAMUSCULAR; INTRAVENOUS ONCE
Status: COMPLETED | OUTPATIENT
Start: 2024-03-08 | End: 2024-03-08

## 2024-03-08 RX ORDER — DEXAMETHASONE SODIUM PHOSPHATE 4 MG/ML
10 INJECTION, SOLUTION INTRA-ARTICULAR; INTRALESIONAL; INTRAMUSCULAR; INTRAVENOUS; SOFT TISSUE ONCE
Status: COMPLETED | OUTPATIENT
Start: 2024-03-08 | End: 2024-03-08

## 2024-03-08 RX ORDER — KETOROLAC TROMETHAMINE 15 MG/ML
15 INJECTION, SOLUTION INTRAMUSCULAR; INTRAVENOUS ONCE
Status: COMPLETED | OUTPATIENT
Start: 2024-03-08 | End: 2024-03-08

## 2024-03-08 RX ADMIN — SODIUM CHLORIDE 1000 ML: 9 INJECTION, SOLUTION INTRAVENOUS at 16:41

## 2024-03-08 RX ADMIN — KETOROLAC TROMETHAMINE 15 MG: 15 INJECTION, SOLUTION INTRAMUSCULAR; INTRAVENOUS at 17:39

## 2024-03-08 RX ADMIN — DEXAMETHASONE SODIUM PHOSPHATE 10 MG: 4 INJECTION INTRA-ARTICULAR; INTRALESIONAL; INTRAMUSCULAR; INTRAVENOUS; SOFT TISSUE at 17:33

## 2024-03-08 RX ADMIN — DIPHENHYDRAMINE HYDROCHLORIDE 12.5 MG: 50 INJECTION, SOLUTION INTRAMUSCULAR; INTRAVENOUS at 17:37

## 2024-03-08 RX ADMIN — IOHEXOL 80 ML: 350 INJECTION, SOLUTION INTRAVENOUS at 17:35

## 2024-03-08 RX ADMIN — METOCLOPRAMIDE 10 MG: 5 INJECTION, SOLUTION INTRAMUSCULAR; INTRAVENOUS at 17:36

## 2024-03-08 ASSESSMENT — ENCOUNTER SYMPTOMS
BLURRED VISION: 1
HEADACHES: 1
DIZZINESS: 1

## 2024-03-08 ASSESSMENT — FIBROSIS 4 INDEX
FIB4 SCORE: 0.24
FIB4 SCORE: 0.24

## 2024-03-08 NOTE — ED TRIAGE NOTES
"Chief Complaint   Patient presents with    Migraine     Reports hx of migraine, states she is having a migraine but \"different than normal, because I usually get them on the side of my head and this one is in the back of my head\".      Physical Exam  Pulmonary:      Effort: Pulmonary effort is normal.   Skin:     General: Skin is warm and dry.   Neurological:      Mental Status: She is alert.         "

## 2024-03-08 NOTE — ED PROVIDER NOTES
"ED Provider Note    CHIEF COMPLAINT  Chief Complaint   Patient presents with    Migraine     Reports hx of migraine, states she is having a migraine but \"different than normal, because I usually get them on the side of my head and this one is in the back of my head\".        EXTERNAL RECORDS REVIEWED  Outpatient Notes patient saw Dr. Torres, neurologist, September 18, 2023 for headache.  She was sent home with Nurtec prescription to try as an abortive measure for migraine.    HPI/ROS  LIMITATION TO HISTORY   Select: : None  OUTSIDE HISTORIAN(S):  None    Korina Potts is a 23 y.o. female who presents to the ER complaining of a posterior headache which began last night around midnight while she was at work at [employer].  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  She has a history of migraine headache and reports this is how her migraines typically start.  However, then she developed a posterior headache.  This is not typical for her migraines.  Typically her migraines are either on the right side or on the left side, most commonly on the right side.  She said that when the headache began she was going over to the [employer] to get something out of the [employer] and her coworker said that her face was really flushed.  They then said she got very pale.  She said she felt as though she was going to pass out.  She said she just recalls feeling very lightheaded like she was going to pass out but does not actually think she fully passed out.  She was around her coworkers the entire time and said that nobody said she completely passed out.  She did not fall to the floor.  There were no code assists called to her location.  Patient states that she typically gets some tingling in bilateral hands and sometimes around her mouth when she has migraines.  She said with respect to the headache " the headache is still present but is better and is now 4 out of 10.  At onset of headache she took some ibuprofen and Tylenol.  She has not taken anything since then.  No diplopia.  No vertigo.  No recent trauma or injury to her head.  No cough, cold or flulike symptoms.  Patient is currently followed by neurology.  She says she started developing migraine headaches when she was a teenager.  However they seem to get a lot better but then about a year ago she started having frequent migraines again.  She saw neurology in November 2023 and was placed on nortriptyline.  She was taken off the nortriptyline in January because she did not think it was really helping her.  She says she usually has a baseline low-grade headache, but will typically get a migraine approximately once a week or so.  However, in the month of February she seemed to be doing pretty well.  She is also on a triptan when she has migraine, but says that she does not really feel like those are very helpful.  She had a pretty severe migraine last week and then had this headache which began last night at midnight.  Patient says she is never had to come to the ER for headaches.  She went to urgent care earlier today for her symptoms and was told to come to the ER for evaluation.  She says she is most concerned about the fact that she nearly passed out with her migraine as this is not typical for her.  She says she will often get lightheadedness with her migraines but she never gets it to the point where she is feels that she is going to pass out.  No chest pains or palpitations during the near syncope.  She says she felt a little short of breath but has asthma and that was pretty normal for her.  Nothing new or different.  She is on the minipill.  No history of blood clots.  No hemoptysis.  No sharp chest pains lately.  No shortness of breath with exertion lately.  No pain or swelling in her legs.  She said that she was also sent home with some samples of  "Nurtec to try if migraine occurred but she does not think the Nurtec has been very helpful either.    PAST MEDICAL HISTORY   has a past medical history of Asthma and Migraine.    SURGICAL HISTORY   has a past surgical history that includes appendectomy.    FAMILY HISTORY  Family History   Problem Relation Age of Onset    Hypertension Mother     Diabetes Father     Rheumatologic Disease Sister     Autoimmune Disease Sister     Heart Disease Maternal Grandfather     Diabetes Paternal Grandmother     Diabetes Paternal Grandfather     Cancer Neg Hx     Ovarian Cancer Neg Hx     Tubal Cancer Neg Hx     Peritoneal Cancer Neg Hx     Colorectal Cancer Neg Hx     Breast Cancer Neg Hx     Bilateral Breast Cancer Neg Hx     Hyperlipidemia Neg Hx     Stroke Neg Hx        SOCIAL HISTORY  Social History     Tobacco Use    Smoking status: Never    Smokeless tobacco: Never   Vaping Use    Vaping Use: Never used   Substance and Sexual Activity    Alcohol use: Never    Drug use: Never    Sexual activity: Yes     Partners: Male     Birth control/protection: Injection       CURRENT MEDICATIONS  Home Medications       Reviewed by Jazmyn Castaneda R.N. (Registered Nurse) on 03/08/24 at 1455  Med List Status: Not Addressed     Medication Last Dose Status   albuterol 108 (90 Base) MCG/ACT Aero Soln inhalation aerosol  Active   Frovatriptan Succinate 2.5 MG Tab  Active   medroxyPROGESTERone (DEPO-PROVERA) 150 MG/ML Suspension  Active   NON SPECIFIED  Active   norethindrone (MICRONOR) 0.35 MG tablet  Active                    ALLERGIES  No Known Allergies    PHYSICAL EXAM  VITAL SIGNS: /71   Pulse 76   Temp 37 °C (98.6 °F) (Temporal)   Resp 18   Ht 1.499 m (4' 11\")   Wt 56.3 kg (124 lb 1.9 oz)   LMP  (LMP Unknown)   SpO2 97%   BMI 25.07 kg/m²    Constitutional:  Well developed, well nourished; No acute distress   HENT: Normocephalic, Atraumatic, Bilateral external ears normal, oropharyngeal examination deferred due to COVID-19 " outbreak and lack of oropharyngeal complaint.  Eyes: PERRL, EOMI, Conjunctiva normal, No discharge.   Neck: Normal range of motion, supple, nontender  Lymphatic: No lymphadenopathy noted.   Cardiovascular: Normal heart rate, Normal rhythm, No murmurs, rubs or gallops   Thorax & Lungs: CTA=bilaterally;  No respiratory distress,  No wheezing rales, or rhonchi; No chest tenderness. No crepitus or subQ air  Abdomen: Soft, nontender, good bowel sounds no guarding no rebound, no masses, no pulsatile mass,  no distention  Skin: Warm, Dry, No erythema, No rash.   Back: No tenderness, No CVA tenderness.   Extremities: 2+ dp and pt pulses bilateral LEs;  Nontender; no pretibial edema  Neurologic: Alert & oriented x 4, clear speech.  No drift of upper or lower extremities.  No ataxia with finger-to-nose.   are 5 out of 5 and equal bilaterally.  Lower extremity strength are 5 out of 5 and equal bilaterally testing dorsiflexors and plantar flexors.  Sensation is intact to light touch.  Extraocular movements are intact.  Cranial nerves II through XII are intact.  Psychiatric: appropriate, normal affect       DIAGNOSTIC STUDIES / PROCEDURES  EKG  I have independently interpreted this EKG  Please see my EKG interpretation below    LABS  Results for orders placed or performed during the hospital encounter of 03/08/24   CBC WITH DIFFERENTIAL   Result Value Ref Range    WBC 7.7 4.8 - 10.8 K/uL    RBC 4.80 4.20 - 5.40 M/uL    Hemoglobin 14.6 12.0 - 16.0 g/dL    Hematocrit 42.6 37.0 - 47.0 %    MCV 88.8 81.4 - 97.8 fL    MCH 30.4 27.0 - 33.0 pg    MCHC 34.3 32.2 - 35.5 g/dL    RDW 41.1 35.9 - 50.0 fL    Platelet Count 416 164 - 446 K/uL    MPV 9.1 9.0 - 12.9 fL    Neutrophils-Polys 62.90 44.00 - 72.00 %    Lymphocytes 29.60 22.00 - 41.00 %    Monocytes 4.80 0.00 - 13.40 %    Eosinophils 1.90 0.00 - 6.90 %    Basophils 0.50 0.00 - 1.80 %    Immature Granulocytes 0.30 0.00 - 0.90 %    Nucleated RBC 0.00 0.00 - 0.20 /100 WBC     Neutrophils (Absolute) 4.84 1.82 - 7.42 K/uL    Lymphs (Absolute) 2.28 1.00 - 4.80 K/uL    Monos (Absolute) 0.37 0.00 - 0.85 K/uL    Eos (Absolute) 0.15 0.00 - 0.51 K/uL    Baso (Absolute) 0.04 0.00 - 0.12 K/uL    Immature Granulocytes (abs) 0.02 0.00 - 0.11 K/uL    NRBC (Absolute) 0.00 K/uL   COMP METABOLIC PANEL   Result Value Ref Range    Sodium 137 135 - 145 mmol/L    Potassium 3.9 3.6 - 5.5 mmol/L    Chloride 103 96 - 112 mmol/L    Co2 23 20 - 33 mmol/L    Anion Gap 11.0 7.0 - 16.0    Glucose 77 65 - 99 mg/dL    Bun 10 8 - 22 mg/dL    Creatinine 0.59 0.50 - 1.40 mg/dL    Calcium 8.9 8.4 - 10.2 mg/dL    Correct Calcium 8.3 (L) 8.5 - 10.5 mg/dL    AST(SGOT) 15 12 - 45 U/L    ALT(SGPT) 13 2 - 50 U/L    Alkaline Phosphatase 92 30 - 99 U/L    Total Bilirubin 0.4 0.1 - 1.5 mg/dL    Albumin 4.7 3.2 - 4.9 g/dL    Total Protein 7.9 6.0 - 8.2 g/dL    Globulin 3.2 1.9 - 3.5 g/dL    A-G Ratio 1.5 g/dL   URINALYSIS (UA)    Specimen: Urine   Result Value Ref Range    Color Yellow     Character Clear     Specific Gravity 1.010 <1.035    Ph 6.5 5.0 - 8.0    Glucose Negative Negative mg/dL    Ketones 15 (A) Negative mg/dL    Protein Negative Negative mg/dL    Bilirubin Negative Negative    Nitrite Negative Negative    Leukocyte Esterase Trace (A) Negative    Occult Blood Trace (A) Negative    Micro Urine Req Microscopic    TROPONIN   Result Value Ref Range    Troponin T <6 6 - 19 ng/L   HCG QUAL SERUM   Result Value Ref Range    Beta-Hcg Qualitative Serum Negative Negative   ESTIMATED GFR   Result Value Ref Range    GFR (CKD-EPI) 128 >60 mL/min/1.73 m 2   URINE MICROSCOPIC (W/UA)   Result Value Ref Range    WBC 2-5 /hpf    RBC 0-2 /hpf    Bacteria Few (A) None /hpf    Epithelial Cells Moderate (A) Few /hpf    Mucous Threads Few /hpf    Urine Crystals Few Amorphous /hpf   EKG   Result Value Ref Range    Report       [facility] Emergency Dept.    Test Date:  2024-03-08  Pt Name:    KATIE ROY                 Department: [facility]  MRN:        [id number]                      Room:       [facility]  Gender:     Female                       Technician: carmen  :        2000                   Requested By:SUMAYA MCGINNIS  Order #:    [id number]                    Reading MD: Sumaya Mcginnis    Measurements  Intervals                                Axis  Rate:       67                           P:          30  WI:         167                          QRS:        29  QRSD:       98                           T:          55  QT:         424  QTc:        448    Interpretive Statements  Sinus rhythm rate 67  Normal axis  Normal intervals  No ST elevation or depression  No previous ECG available for comparison  Electronically Signed On 2024 18:25:27 PST by Sumaya Mcginnis          RADIOLOGY  I have independently interpreted the diagnostic imaging associated with this visit and am waiting the final reading from the radiologist.     My preliminary interpretation is as follows: ER MD is reviewed the patient's chest x-ray and the CT brain.  No obvious head bleed.  No obvious pneumonia.    Radiologist interpretation:   CT-CTA HEAD WITH & W/O-POST PROCESS   Final Result      CT angiogram of the [place] within normal limits.      CT-CTA NECK WITH & W/O-POST PROCESSING   Final Result      CT angiogram of the neck within normal limits.      DX-CHEST-PORTABLE (1 VIEW)   Final Result      No acute cardiac or pulmonary abnormalities are identified.           COURSE & MEDICAL DECISION MAKING    ED Observation Status? No; Patient does not meet criteria for ED Observation.     INITIAL ASSESSMENT, COURSE AND PLAN  Care Narrative: Patient presents to the ER with complaint of posterior headache which began as a typical migraine.  She said she had her typical aura of squiggles and blurry vision with nausea preceding the headache.  Headaches are typically located on the right side of her head and sometimes the left side of her head.  She says  that they are not typically located in the back of her head.  This particular headache was located in the back of her head and she thought that was a bit strange.  Initially, she described a lot of lightheadedness to the point of near syncope after onset of headache.  She was at work at [employer] last night when the headache began and said that her coworkers saw that her face got red and then she turned very pale.  She almost passed out but did not fall to the floor.  She actually did not lose consciousness as far as she knows and said that there was nobody around her that called a code assist or made mention of any edison syncope.  Patient says she often gets lightheaded with her migraine, but never this lightheaded and is never almost passed out before.  She did not have any chest pain or palpitations prior to or during the near syncopal episode.  She says she did have a little bit of shortness of breath but says that is common for her as she has asthma.  She does not feel short of breath now.  No recent chest pains.  No hemoptysis.  No pain or swelling in her legs.  She is not tachycardic or hypoxic.  I do not think she has PE.  EKG is nonacute.  Troponin is negative.  At this time no concern for STEMI, non-STEMI, myocarditis or pericarditis.  Since this was a bit of an unusual headache for patient given his location and her near syncope, I did do a CT CTA of the head and neck to be sure there was no dissection or aneurysm or head bleed.  CT/CTA of the head and neck is negative per radiologist.  Patient is followed by neurology for her migraine headaches.  She has an appointment with Dr. Torres on March 18.  She was given a migraine cocktail here in the ER for headache and she is feeling significantly better.  She is not orthostatic.  Her lab work is unremarkable.  No signs of pneumonia or urinary tract infection.  Low suspicion for meningitis or subarachnoid hemorrhage.  No need for lumbar puncture.  She is not  pregnant.  At this time I think the patient's near syncopal episode was due to a migraine headache.  I think she is safe and stable for outpatient management and discharged home.  She is to drink plenty of fluids.  She has been given strict return precautions and discharge instructions and she understands treatment plan and follow-up.    Upon discharge patient reports that headache is gone.  No nausea.  She says she still has some residual tingling in bilateral fingertips, but this is normal for her with her migraines.      ADDITIONAL PROBLEM LIST  Problem #1: Migraine headache  Problem #2: Near syncope    DISPOSITION AND DISCUSSIONS  I have discussed management of the patient with the following physicians and ALICIA's: None    Discussion of management with other Q or appropriate source(s): None     Escalation of care considered, and ultimately not performed: Headache started with typical aura.  It is actually better than it was last night.  No new neurologic symptoms.  No fevers.  No stiff neck.  It was not thunderclap.  No vomiting.  No concern for meningitis or subarachnoid.  No need for LP at this time.    Barriers to care at this time, including but not limited to:  None known .     Decision tools and prescription drugs considered including, but not limited to:  Patient was given migraine cocktail with significant improvement in her headache. .    FINAL DIAGNOSIS  1. Migraine without status migrainosus, not intractable, unspecified migraine type Acute   2. Near syncope Acute        This dictation has been created using voice recognition software. The accuracy of the dictation is limited by the abilities of the software. I expect there may be some errors of grammar and possibly content. I made every attempt to manually correct the errors within my dictation. However, errors related to voice recognition software may still exist and should be interpreted within the appropriate context.     Electronically signed by:  Charity Mcginnis M.D., 3/8/2024 3:40 PM

## 2024-03-08 NOTE — PROGRESS NOTES
"Subjective:   Korina Potts is a 23 y.o. female who presents for Headache, Syncope (X1 day), and Numbness (Bilateral hands, around mouth)      HPI: This is a 23-year-old female who presents today for headache, near syncope, hand numbness, and blurred vision.  Patient reports symptoms developed yesterday while she was at work.  She reports that her symptoms started with nausea.  She states that she felt as if she was going to \"pass out \".  She states that she took Motrin and Tylenol last night.  She has had some improvement in her symptoms, but states that symptoms have not completely resolved.  Patient does have underlying history of chronic migraine headaches.  She states that symptoms are unlike her typical migraine headaches.  She is followed by neurology for migraines.  She has follow-up visit on March 18 for this.  She reports that her pain is 4\10 and is affecting the posterior aspect of her head.  She states that she feels increased pressure in her head.  The patient was previously taking nortriptyline and frovatriptan for her migraine headaches.  She says she has been without this medication since January.    Review of Systems   Eyes:  Positive for blurred vision.   Neurological:  Positive for dizziness and headaches.        +bilat hand numbness       Medications:    Current Outpatient Medications on File Prior to Visit   Medication Sig Dispense Refill    norethindrone (MICRONOR) 0.35 MG tablet       Frovatriptan Succinate 2.5 MG Tab 5mg as needed for migraine. (Patient not taking: Reported on 3/8/2024) 18 Tablet 1    NON SPECIFIED Nurtec-ODT 75 mg. 1 tab at headache onset, repeat in 24 hours if necessary. (Patient not taking: Reported on 2/26/2024) 4 Each 0    albuterol 108 (90 Base) MCG/ACT Aero Soln inhalation aerosol Inhale 2 Puffs every 6 hours as needed for Shortness of Breath.      medroxyPROGESTERone (DEPO-PROVERA) 150 MG/ML Suspension 1 mL. (Patient not taking: Reported on 2/26/2024)   " "    No current facility-administered medications on file prior to visit.        Allergies:   Patient has no known allergies.    Problem List:   Patient Active Problem List   Diagnosis    Asthma    Family history of autoimmune disorder    Vitamin D deficiency    Migraine with aura and without status migrainosus, not intractable        Surgical History:  Past Surgical History:   Procedure Laterality Date    APPENDECTOMY         Past Social Hx:   Social History     Tobacco Use    Smoking status: Never    Smokeless tobacco: Never   Vaping Use    Vaping Use: Never used   Substance Use Topics    Alcohol use: Never    Drug use: Never          Problem list, medications, and allergies reviewed by myself today in Epic.     Objective:     BP 98/56 (BP Location: Left arm, Patient Position: Sitting, BP Cuff Size: Adult)   Pulse 77   Temp 36.1 °C (96.9 °F)   Resp 16   Ht 1.499 m (4' 11\")   Wt 55.7 kg (122 lb 12.8 oz)   SpO2 100%   BMI 24.80 kg/m²     Physical Exam  Vitals and nursing note reviewed.   Constitutional:       General: She is not in acute distress.     Appearance: Normal appearance. She is normal weight. She is not ill-appearing, toxic-appearing or diaphoretic.   HENT:      Head: Normocephalic and atraumatic.   Cardiovascular:      Rate and Rhythm: Normal rate and regular rhythm.      Pulses: Normal pulses.      Heart sounds: Normal heart sounds. No murmur heard.     No friction rub. No gallop.   Pulmonary:      Effort: Pulmonary effort is normal. No respiratory distress.      Breath sounds: Normal breath sounds. No stridor. No wheezing, rhonchi or rales.   Chest:      Chest wall: No tenderness.   Musculoskeletal:      Cervical back: Neck supple. No tenderness.   Lymphadenopathy:      Cervical: No cervical adenopathy.   Skin:     General: Skin is warm and dry.      Capillary Refill: Capillary refill takes less than 2 seconds.   Neurological:      General: No focal deficit present.      Mental Status: She is " alert and oriented to person, place, and time. Mental status is at baseline.      GCS: GCS eye subscore is 4. GCS verbal subscore is 5. GCS motor subscore is 6.      Cranial Nerves: Cranial nerves 2-12 are intact. No cranial nerve deficit.      Motor: No weakness.      Coordination: Finger-Nose-Finger Test normal.      Gait: Gait normal.   Psychiatric:         Mood and Affect: Mood normal.         Behavior: Behavior normal.         Thought Content: Thought content normal.         Judgment: Judgment normal.         Assessment/Plan:     Diagnosis and associated orders:   1. Migraine with aura and without status migrainosus, not intractable        2. Near syncope        3. Blurred vision               Comments/MDM:   Acute on chronic problem.  Patient presents today for migraine headache, near syncopal episode, and blurred vision that started last night.  She has had some improvement in her symptoms, but states that her symptoms have not fully resolved.  Patient is very tearful as she states that her migraine headache is not consistent with her typical migraine headaches.  At this time, I am referring the patient to the emergency room for further evaluation and management.  Patient will have significant other take her to [facility]  Transfer center called.           Please note that this dictation was created using voice recognition software. I have made a reasonable attempt to correct obvious errors, but I expect that there are errors of grammar and possibly content that I did not discover before finalizing the note.    This note was electronically signed by BETH Layton

## 2024-03-09 NOTE — DISCHARGE INSTRUCTIONS
Follow-up with your neurologist on March 18 as scheduled.    Also follow-up with your primary care physician early this coming week.  Please call for appointment.    Drink plenty of fluids to stay well-hydrated.    Return to the ER for any worsening headache, changing headache, fevers over 100.4, shaking chills, nausea, vomiting, double vision, stiff neck, rash, numbness/tingling/weakness of extremities, vertigo, recurrent episodes of severe lightheadedness, episodes of passing out, chest pain, shortness of breath, coughing up phlegm or blood, or for any concerns.    If you feel another migraine headache coming on, you may take 800 mg of over-the-counter Advil, plus 25 mg of over-the-counter Benadryl plus the 25 mg promethazine tablet which is prescribed to you here in the ER today.  This may help abort a recurrent migraine headache.

## 2024-03-11 LAB
BACTERIA UR CULT: ABNORMAL
BACTERIA UR CULT: ABNORMAL
SIGNIFICANT IND 70042: ABNORMAL
SITE SITE: ABNORMAL
SOURCE SOURCE: ABNORMAL

## 2024-03-12 NOTE — ED NOTES
ED Positive Culture Follow-up/Notification Note:    Date: 3/12/24   Patient seen in the ED on 3/8/2024 for headache.  Patient reports history of migraines with similar presentation.   Physical exam notable for no abdominal tenderness or CVA tenderness. No signs of UTI per ERP note. In the ED patient was afebrile with stable vitals, WBC count 7.7. Patient treated with migraine cocktail in the ED.   1. Migraine without status migrainosus, not intractable, unspecified migraine type Acute   2. Near syncope Acute      Discharge Medication List as of 3/8/2024  6:34 PM        START taking these medications    Details   promethazine (PHENERGAN) 25 MG Tab Take 1 Tablet by mouth every 8 hours as needed for Nausea/Vomiting for up to 2 days., Disp-6 Tablet, R-0, Normal           Allergies: Patient has no known allergies.     Vitals:    03/08/24 1619 03/08/24 1620 03/08/24 1621 03/08/24 1813   BP: (!) 142/81 (!) 144/87 136/74 123/71   Pulse: 77 71 67 76   Resp: 16  18 18   Temp:    37 °C (98.6 °F)   TempSrc:    Temporal   SpO2: 98% 99% 99% 97%   Weight:       Height:         Final cultures:   Results       Procedure Component Value Units Date/Time    URINE CULTURE(NEW) [[id number]]  (Abnormal) Collected: 03/08/24 1715    Order Status: Completed Specimen: Urine, Clean Catch Updated: 03/11/24 1526     Significant Indicator POS     Source UR     Site URINE, CLEAN CATCH     Culture Result -      Gardnerella vaginalis  >100,000 cfu/mL      Narrative:      Indication for culture:->Patient WITHOUT an indwelling Albright  catheter in place with new onset of Dysuria, Frequency,  Urgency, and/or Suprapubic pain  Indication for culture:->Patient WITHOUT an indwelling Albright    URINALYSIS (UA) [[id number]]  (Abnormal) Collected: 03/08/24 1715    Order Status: Completed Specimen: Urine Updated: 03/08/24 1740     Color Yellow     Character Clear     Specific Gravity 1.010     Ph 6.5     Glucose Negative mg/dL      Ketones 15 mg/dL      Protein  Negative mg/dL      Bilirubin Negative     Nitrite Negative     Leukocyte Esterase Trace     Occult Blood Trace     Micro Urine Req Microscopic          Plan:   Patient's urine culture resulted with >100k G. Vaginalis. G. Vaginalis is a common urogenital colonizer and not likely the true urinary pathogen.  Discussed with patient, patient denies any symptoms consistent with BV. No treatment required at this time. Patient advised to monitor for any symptoms consistent with BV (vaginal itching, burning, or pain; vaginal discharge with a strong, fish-like odor) to return for re-evaluation and treatment.   Te Rosenthal, PharmD

## 2024-03-18 ENCOUNTER — OFFICE VISIT (OUTPATIENT)
Dept: NEUROLOGY | Facility: MEDICAL CENTER | Age: 24
End: 2024-03-18
Attending: PSYCHIATRY & NEUROLOGY
Payer: COMMERCIAL

## 2024-03-18 VITALS
DIASTOLIC BLOOD PRESSURE: 58 MMHG | SYSTOLIC BLOOD PRESSURE: 118 MMHG | OXYGEN SATURATION: 100 % | HEART RATE: 70 BPM | TEMPERATURE: 97.5 F | HEIGHT: 59 IN | BODY MASS INDEX: 25.2 KG/M2 | WEIGHT: 125 LBS

## 2024-03-18 DIAGNOSIS — G43.109 MIGRAINE WITH AURA AND WITHOUT STATUS MIGRAINOSUS, NOT INTRACTABLE: Primary | ICD-10-CM

## 2024-03-18 DIAGNOSIS — R20.2 PARESTHESIAS: ICD-10-CM

## 2024-03-18 PROCEDURE — 3078F DIAST BP <80 MM HG: CPT | Performed by: PSYCHIATRY & NEUROLOGY

## 2024-03-18 PROCEDURE — 99212 OFFICE O/P EST SF 10 MIN: CPT | Performed by: PSYCHIATRY & NEUROLOGY

## 2024-03-18 PROCEDURE — 3074F SYST BP LT 130 MM HG: CPT | Performed by: PSYCHIATRY & NEUROLOGY

## 2024-03-18 PROCEDURE — 99215 OFFICE O/P EST HI 40 MIN: CPT | Performed by: PSYCHIATRY & NEUROLOGY

## 2024-03-18 RX ORDER — ELETRIPTAN HYDROBROMIDE 40 MG/1
TABLET, FILM COATED ORAL
Qty: 9 TABLET | Refills: 4 | Status: SHIPPED | OUTPATIENT
Start: 2024-03-18

## 2024-03-18 RX ORDER — NIMODIPINE 30 MG/1
30 CAPSULE, LIQUID FILLED ORAL 2 TIMES DAILY
Qty: 60 CAPSULE | Refills: 4 | Status: SHIPPED | OUTPATIENT
Start: 2024-03-18

## 2024-03-18 ASSESSMENT — ENCOUNTER SYMPTOMS
FALLS: 0
MEMORY LOSS: 0
SENSORY CHANGE: 1
FOCAL WEAKNESS: 0
TINGLING: 1

## 2024-03-18 ASSESSMENT — PATIENT HEALTH QUESTIONNAIRE - PHQ9: CLINICAL INTERPRETATION OF PHQ2 SCORE: 0

## 2024-03-18 ASSESSMENT — FIBROSIS 4 INDEX: FIB4 SCORE: 0.25

## 2024-03-18 NOTE — PROGRESS NOTES
Manjinder Potts is a 23 y.o. female who presents for follow-up, patient of BETH Orr, with a history of persistent migraine with aura, who has failed treatments up to this point, but has had separate events starting on March 8, 2024, of head pressure associated with persistent paresthesias in all 4 extremities.     KELI uHi states that since last seen, the headaches have continued in the same persistent pattern.  She has the headaches associated with the visual aura symptoms with blurred vision, as well as left facial and mouth numbness extending into the left upper extremity.  Still these are associated with shooting pain episodes that are brief but persistent, the migraines lasting between the shooting pain episodes.  She still has the early nausea.    On March 8, 2024, she had an episode of sudden pain that was different from her migraines, not preceded by the typical aura symptoms visually or sensory, followed by severe dizziness and paresthesias involving all 4 extremities.  The numbness and tingling is most intense in the feet, she notes this persisting.  Even walking on her feet feels a little bit different.  She denies any loss of sensation.  Balance is not curtailed.  In the upper extremities it is more of a fluctuating course though has not resolved.    I reviewed the records of her ER admission, CT of the brain as well as CT of the brain and neck were done and revealed no significant pathologies.  It was recommended that she follow-up in the office.    In our record she had called in because the headaches were somewhat unrelenting, she had been placed on supplements including magnesium, riboflavin and coenzyme Q10 when first seen, these were provided little benefit.  She was given Nurtec samples which also provided no benefit.  She was then placed on nortriptyline 50 mg every evening, the headache did seem to improve slightly, but not to the point where she felt it  "was significant.  She stopped the drug.    Medical, surgical and family histories are reviewed, there are no new drug allergies.  She is on Micronor.    Review of Systems   Constitutional:  Negative for malaise/fatigue.   Musculoskeletal:  Negative for falls.   Neurological:  Positive for tingling and sensory change. Negative for focal weakness.   Psychiatric/Behavioral:  Negative for memory loss.    All other systems reviewed and are negative.    Objective     /58 (BP Location: Right arm, Patient Position: Sitting, BP Cuff Size: Adult)   Pulse 70   Temp 36.4 °C (97.5 °F) (Temporal)   Ht 1.499 m (4' 11\")   Wt 56.7 kg (125 lb)   LMP  (LMP Unknown)   SpO2 100%   BMI 25.25 kg/m²      Physical Exam    She appears in no acute distress.  Vital signs are stable.  There is no malar rash.  The neck is supple, range of motion is full, compression maneuvers are negative.  Lhermitte's phenomena is absent.  There is no tenderness at the elbows medially or at the wrists.  Tinel's sign is absent bilaterally.  Straight leg raising is negative bilaterally.  Distal pulses are intact throughout.     Neurological Exam    Fully oriented, there is no aphasia, apraxia, or inattention.    PERRLA/EOMI, visual fields are full, facial movements are symmetric, sensory exam is intact to temperature and pinprick.  There is no bulbar dysfunction.  Shoulder shrug and head rotation are normal.    Musculoskeletal exam reveals normal tone without tremor or drift.  Strength is 5/5 in all 4 extremities throughout.  Reflexes are present at all points of the ankle jerks are relatively diminished bilaterally.  None are dropped.  The toes are downgoing bilaterally.    Repetitive movements are intact and symmetric in the lower and upper extremities from side-to-side.  There is no appendicular dystaxia.  Gait is normal and station and stride length armswing is symmetric; heel, toe, and tandem walking are normal.  Repetitive movements are " symmetric in amplitude and frequency.    Sensory exam reveals a subjective decrement of temperature and pinprick on the left side when compared to the right though she does perceive these modalities throughout.  Vibration is felt equally.    Assessment & Plan     1. Migraine with aura and without status migrainosus, not intractable  We will continue to try new medications for maintenance therapy.  Calcium channel blockers as a class tend to be a little more effective for migraine with aura, I would use nimodipine 30 mg, twice daily.  Side effects were reviewed.  She will notify the office via [employer].  For rescue we will try Relpax 40 mg taken at pain onset, not with the order itself, and then repeated within an hour or longer if necessary.    - MR-CERVICAL SPINE-W/O; Future  - niMODipine (NIMOTOP) 30 MG Cap; Take 1 Capsule by mouth 2 times a day.  Dispense: 60 Capsule; Refill: 4  - eletriptan (RELPAX) 40 MG tablet; 1 tab at headache onset; repeat in 1 hour prn  Dispense: 9 Tablet; Refill: 4    2. Paresthesias  A little disconcerting simply because there is a subjective decrement of sensation in the feet when she walks with bare feet on a carpeted surface in the office.  The bilaterality, and absence of symptoms in the face suggest a cervical cord possibility.  Fortunately her neurologic examination is otherwise undisturbed.  What ever this was that first occurred on March 8 remains to be seen.  This pressure sensation on the top of the head followed by paresthesias throughout the is a little atypical for a cervical spine process, but at least CT scan revealed no evidence of mass or tumor.  MRI of the brain might be required depending on result of MRI of the neck and symptoms she experiences.    - MR-CERVICAL SPINE-W/O; Future    Time: 40 minutes in total spent in patient care including creatinine charting, record review, discussion with healthcare staff and documentation.  Includes face-to-face time for exam,  review, discussion, as well as counseling and coordinating care.

## 2024-04-02 ENCOUNTER — APPOINTMENT (OUTPATIENT)
Dept: URGENT CARE | Facility: PHYSICIAN GROUP | Age: 24
End: 2024-04-02
Payer: COMMERCIAL

## 2024-04-09 ENCOUNTER — APPOINTMENT (OUTPATIENT)
Dept: RADIOLOGY | Facility: MEDICAL CENTER | Age: 24
End: 2024-04-09
Attending: PSYCHIATRY & NEUROLOGY
Payer: COMMERCIAL

## 2024-04-09 DIAGNOSIS — R20.2 PARESTHESIAS: ICD-10-CM

## 2024-04-09 DIAGNOSIS — G43.109 MIGRAINE WITH AURA AND WITHOUT STATUS MIGRAINOSUS, NOT INTRACTABLE: ICD-10-CM

## 2024-04-09 PROCEDURE — 72141 MRI NECK SPINE W/O DYE: CPT

## 2024-04-24 ENCOUNTER — APPOINTMENT (OUTPATIENT)
Dept: URGENT CARE | Facility: PHYSICIAN GROUP | Age: 24
End: 2024-04-24
Payer: COMMERCIAL

## 2024-04-29 ENCOUNTER — HOSPITAL ENCOUNTER (OUTPATIENT)
Facility: MEDICAL CENTER | Age: 24
End: 2024-04-29
Attending: PHYSICIAN ASSISTANT
Payer: COMMERCIAL

## 2024-04-29 ENCOUNTER — OFFICE VISIT (OUTPATIENT)
Dept: MEDICAL GROUP | Facility: PHYSICIAN GROUP | Age: 24
End: 2024-04-29
Payer: COMMERCIAL

## 2024-04-29 VITALS
DIASTOLIC BLOOD PRESSURE: 62 MMHG | RESPIRATION RATE: 16 BRPM | HEIGHT: 59 IN | BODY MASS INDEX: 27.2 KG/M2 | TEMPERATURE: 98.1 F | HEART RATE: 75 BPM | OXYGEN SATURATION: 98 % | WEIGHT: 134.9 LBS | SYSTOLIC BLOOD PRESSURE: 108 MMHG

## 2024-04-29 DIAGNOSIS — R82.90 ABNORMAL URINALYSIS: ICD-10-CM

## 2024-04-29 DIAGNOSIS — B37.9 ANTIBIOTIC-INDUCED YEAST INFECTION: ICD-10-CM

## 2024-04-29 DIAGNOSIS — T36.95XA ANTIBIOTIC-INDUCED YEAST INFECTION: ICD-10-CM

## 2024-04-29 DIAGNOSIS — R10.30 LOWER ABDOMINAL PAIN: ICD-10-CM

## 2024-04-29 DIAGNOSIS — J45.20 MILD INTERMITTENT ASTHMA WITHOUT COMPLICATION: ICD-10-CM

## 2024-04-29 LAB
APPEARANCE UR: CLEAR
BILIRUB UR STRIP-MCNC: NEGATIVE MG/DL
COLOR UR AUTO: NORMAL
GLUCOSE UR STRIP.AUTO-MCNC: NEGATIVE MG/DL
KETONES UR STRIP.AUTO-MCNC: NEGATIVE MG/DL
LEUKOCYTE ESTERASE UR QL STRIP.AUTO: NORMAL
NITRITE UR QL STRIP.AUTO: NEGATIVE
PH UR STRIP.AUTO: 5.5 [PH] (ref 5–8)
POCT INT CON NEG: NEGATIVE
POCT INT CON POS: POSITIVE
POCT URINE PREGNANCY TEST: NEGATIVE
PROT UR QL STRIP: NORMAL MG/DL
RBC UR QL AUTO: NORMAL
SP GR UR STRIP.AUTO: 1.02
UROBILINOGEN UR STRIP-MCNC: NORMAL MG/DL

## 2024-04-29 PROCEDURE — 87086 URINE CULTURE/COLONY COUNT: CPT

## 2024-04-29 RX ORDER — ALBUTEROL SULFATE 90 UG/1
2 AEROSOL, METERED RESPIRATORY (INHALATION) EVERY 6 HOURS PRN
Qty: 8.5 G | Refills: 11 | Status: SHIPPED | OUTPATIENT
Start: 2024-04-29

## 2024-04-29 RX ORDER — CEFDINIR 300 MG/1
300 CAPSULE ORAL 2 TIMES DAILY
Qty: 10 CAPSULE | Refills: 0 | Status: SHIPPED | OUTPATIENT
Start: 2024-04-29 | End: 2024-05-04

## 2024-04-29 RX ORDER — FLUCONAZOLE 150 MG/1
150 TABLET ORAL ONCE
Qty: 1 TABLET | Refills: 0 | Status: SHIPPED | OUTPATIENT
Start: 2024-04-29 | End: 2024-04-29

## 2024-04-29 ASSESSMENT — ENCOUNTER SYMPTOMS
ABDOMINAL PAIN: 1
SHORTNESS OF BREATH: 0
FEVER: 0
CHILLS: 0

## 2024-04-29 ASSESSMENT — FIBROSIS 4 INDEX: FIB4 SCORE: 0.25

## 2024-04-29 NOTE — PROGRESS NOTES
"SUBJECTIVE:     CC: abdominal pain    HPI:   Korina presents today with the following:    Lower abdominal pain x 1 week  Foul smelling urine only one episode  Denies dysuria, fever, N/V/D, constipation  + home UTI test  LMP: started today    ASSESSMENT & PLAN by Problem:       Problem List Items Addressed This Visit       Asthma    Relevant Medications    albuterol 108 (90 Base) MCG/ACT Aero Soln inhalation aerosol     Other Visit Diagnoses       Lower abdominal pain        Relevant Medications    cefdinir (OMNICEF) 300 MG Cap    Other Relevant Orders    POCT Urinalysis (Completed)    POCT Pregnancy (Completed)    URINE CULTURE(NEW)    Abnormal urinalysis        Relevant Medications    cefdinir (OMNICEF) 300 MG Cap    Other Relevant Orders    URINE CULTURE(NEW)    Antibiotic-induced yeast infection        Relevant Medications    fluconazole (DIFLUCAN) 150 MG tablet        Acute, uncontrolled.    UA: large blood, pro 30, trace LE  Hcg: negative    Patient symptoms are not classic UTI but does have some left lower quadrant/suprapubic pain and positive UA.  Patient did just start her period today.  Sending over antibiotics should symptoms worsen over the next few days to become more consistent with UTI.  Patient otherwise will wait for the culture results.    Return if symptoms worsen or fail to improve.                ROS:  Review of Systems   Constitutional:  Negative for chills and fever.   Respiratory:  Negative for shortness of breath.    Cardiovascular:  Negative for chest pain.   Gastrointestinal:  Positive for abdominal pain.       OBJECTIVE:     Exam:  /62 (BP Location: Left arm, Patient Position: Sitting, BP Cuff Size: Adult)   Pulse 75   Temp 36.7 °C (98.1 °F) (Temporal)   Resp 16   Ht 1.499 m (4' 11\")   Wt 61.2 kg (134 lb 14.4 oz)   LMP 04/29/2024 (Exact Date)   SpO2 98%   Breastfeeding No   BMI 27.25 kg/m²  Body mass index is 27.25 kg/m².    Physical Exam  Vitals reviewed.   Constitutional:  "      General: She is not in acute distress.     Appearance: Normal appearance.   Pulmonary:      Effort: Pulmonary effort is normal.   Abdominal:      Tenderness: There is no right CVA tenderness, left CVA tenderness, guarding or rebound.      Comments: Mild TTP suprapubic region.   Neurological:      General: No focal deficit present.      Mental Status: She is alert.   Psychiatric:         Mood and Affect: Mood normal.         Behavior: Behavior normal.         Judgment: Judgment normal.           Please note that this dictation was created using voice recognition software. I have made every reasonable attempt to correct obvious errors, but I expect that there are errors of grammar and possibly content that I did not discover before finalizing the note.

## 2024-05-02 LAB
BACTERIA UR CULT: NORMAL
SIGNIFICANT IND 70042: NORMAL
SITE SITE: NORMAL
SOURCE SOURCE: NORMAL

## 2024-07-19 ENCOUNTER — OFFICE VISIT (OUTPATIENT)
Dept: NEUROLOGY | Facility: MEDICAL CENTER | Age: 24
End: 2024-07-19
Payer: COMMERCIAL

## 2024-07-19 VITALS
OXYGEN SATURATION: 99 % | WEIGHT: 126.98 LBS | HEIGHT: 59 IN | TEMPERATURE: 96.7 F | DIASTOLIC BLOOD PRESSURE: 60 MMHG | BODY MASS INDEX: 25.6 KG/M2 | HEART RATE: 67 BPM | SYSTOLIC BLOOD PRESSURE: 118 MMHG

## 2024-07-19 DIAGNOSIS — G43.109 MIGRAINE WITH AURA AND WITHOUT STATUS MIGRAINOSUS, NOT INTRACTABLE: ICD-10-CM

## 2024-07-19 PROCEDURE — 3078F DIAST BP <80 MM HG: CPT

## 2024-07-19 PROCEDURE — 99211 OFF/OP EST MAY X REQ PHY/QHP: CPT

## 2024-07-19 PROCEDURE — 3074F SYST BP LT 130 MM HG: CPT

## 2024-07-19 PROCEDURE — 99215 OFFICE O/P EST HI 40 MIN: CPT

## 2024-07-19 RX ORDER — DEXAMETHASONE 2 MG/1
TABLET ORAL
Qty: 10 TABLET | Refills: 0 | Status: SHIPPED | OUTPATIENT
Start: 2024-07-19 | End: 2024-07-22

## 2024-07-19 RX ORDER — ELETRIPTAN HYDROBROMIDE 40 MG/1
TABLET, FILM COATED ORAL
Qty: 9 TABLET | Refills: 11 | Status: SHIPPED | OUTPATIENT
Start: 2024-07-19

## 2024-07-19 RX ORDER — NIMODIPINE 30 MG/1
30 CAPSULE, LIQUID FILLED ORAL 2 TIMES DAILY
Qty: 60 CAPSULE | Refills: 11 | Status: SHIPPED | OUTPATIENT
Start: 2024-07-19

## 2024-07-19 ASSESSMENT — FIBROSIS 4 INDEX: FIB4 SCORE: 0.25

## 2024-09-11 ENCOUNTER — APPOINTMENT (OUTPATIENT)
Dept: RADIOLOGY | Facility: MEDICAL CENTER | Age: 24
End: 2024-09-11
Payer: COMMERCIAL

## 2024-09-11 DIAGNOSIS — G43.109 MIGRAINE WITH AURA AND WITHOUT STATUS MIGRAINOSUS, NOT INTRACTABLE: ICD-10-CM

## 2024-09-11 PROCEDURE — 70553 MRI BRAIN STEM W/O & W/DYE: CPT

## 2024-09-11 PROCEDURE — 700117 HCHG RX CONTRAST REV CODE 255: Mod: JZ

## 2024-09-11 PROCEDURE — A9579 GAD-BASE MR CONTRAST NOS,1ML: HCPCS | Mod: JZ

## 2024-09-11 RX ADMIN — GADOTERIDOL 10 ML: 279.3 INJECTION, SOLUTION INTRAVENOUS at 16:06

## 2024-11-08 ENCOUNTER — EH NON-PROVIDER (OUTPATIENT)
Dept: OCCUPATIONAL MEDICINE | Facility: CLINIC | Age: 24
End: 2024-11-08

## 2024-11-08 DIAGNOSIS — Z02.89 ENCOUNTER FOR OCCUPATIONAL HEALTH EXAMINATION INVOLVING RESPIRATOR: ICD-10-CM

## 2024-11-08 PROCEDURE — 94375 RESPIRATORY FLOW VOLUME LOOP: CPT | Performed by: NURSE PRACTITIONER

## 2024-11-20 NOTE — PROGRESS NOTES
[facility]  GENERAL NEUROLOGY  FOLLOW UP VISIT    HISTORY OF ILLNESS:  Korina Potts is a 24 y.o. female with a history most notable for headache.  She presents for follow-up to discuss her use of eletriptan and the mode of pain and its effectiveness on her migraines.  Today, Korina provided the following  headache interm history:    11/20/24- She is on nimodipine 30 mg. September 1-2/30, October 1/30, November 1/30.  She does report migraines are worse during her period.  She has been taking Eletriptan which she does feels very helpful at reducing the migraine but it does not sound like it aborts the migraine.  She is also reporting residual migraine features such as her numbness and tingling on her face and twitching/ticking of her face.      7/19/24- April 9 /30, May 5 /30, Samantha 3/30, July 5/19. She reports that her migraines are more intense 10/10. She report a daily migraine in  the last week possible due to her menstrual cycle and exposure to light. The  intensity is 4/10 for the current migraine. She uses Eletriptan  which will normally bring a 10/10 migraine down to 0/10 but sometimes will take over three hours to go away.  She is currently on nimodipine which has be very effective for her migraines however if she misses a dose or decreases it down to 30 her migraines come back immediately.  She does report sometimes forgetting to take her nimodipine but has been very diligent about taking her doses now she does have fluctuation in blood pressure but reports that she is drinking more water and it is helping with her blood pressure.      Below information was gathered from previous appointment:  Headache description history:  A sharp pain in the right (sometimes the left) eye that would trigger a headache. She then will have blurred vision with pain that can increase to where she would have numbness in the left mouth down to left arm and fingers.The pain would radiate around her head and  sometimes down her neck. She does report scalp pain that when touched that can cause a headache.     Korina states that she feels that her migraines increased 1-2 months after delivery of her  2nd child approximately December- January 2022/2023.  She denies having these migraines after her first child or any time during either pregnancy. She is currently on depo-provera shot and stopped breastfeeding in May 2023.She reports that initially she had  migraines every single day since December/ January until July when it would skip a few days until it stopped approximately 3 weeks ago. The migraines would usually fluctuate where she had a migraine for 1 hour then would have a lingering pain. She reports that she could have multiple migraines a day with a lingering headache that would continue until the next migraine. She also reports being incapacitated with severe migraine symptoms for 10 days a month at least 4 hours each occurrence.    Currently she states that she is having scalp pain that can trigger a headache when she touches either bilateral parietal region, puts her hair up, or washes her hair. She also gets shooting pain down her right or left parietal region radiating to her eye that is a 7/10 in intensity but will only last for approximately 2-3 minutes but she can have about 2-3 episodes of this shooting pain a day. Korina also reports a new light sensitivity when she has eye exams that cause dizziness that will resolve in a few minutes.       The following is a summary of headache symptoms, presented in my standard format:    Family History: none  Age at onset (years): 14 years old  Location: Primarily right orbit can originate in the left  Radiation: can radiate full head and sometimes down the neck  Frequency: 2-3 small shooting sensations daily, migraines were everyday for months Dec 22/Jan 23 until August 23  Duration: shooting pain- 2-3 minutes, migraine pain 1 hour an episode  Headache Days/Month:  30/30 (everyday shooting sensation, everyday migraine prior to July)  Quality: shooting pain- sharp, migraine- squeezing, pressure, throbbing  Intensity: shooting pain- 7/10, migraine pain-10/10  Aura: none  Photophobia/Phonophobia/Nausea/Vomiting: intermittent (usually the sun), yes, intermittent, no  Provoked by Physical Activity?: no  Triggers: touching the parietal region of the bilateral head  Associated Symptoms: left mouth  numbness and left arm numbness/tingling  Autonomic Signs (such as ptosis, miosis, conjunctival injection, rhinorrhea, increased lacrimation): none currently. Remote right eye swelling once after swimming with a migraine  Head Trauma: none  Association with Menses: yes  ED Visits: none  Hospitalizations: none  Missed Work Days (nurse): none  Sleep (hours/night): 4-6 hours  Caffeine Intake: approximately 2 cokes daily  Hydration: yes  Nutrition: skips breakfast  Exercise: no  Analgesic Overuse: naproxen 660 mg on occasion with the migraines    Current Medication Regimen:  - nimodipine  - eletriptan     Medications Tried: Response  Preventive:  - Nortriptyline    Rescue:  - frovatriptan     Medications Not Tried:  -     MEDICAL AND SURGICAL HISTORY:  Past Medical History:   Diagnosis Date    Asthma     Migraine      Past Surgical History:   Procedure Laterality Date    APPENDECTOMY       MEDICATIONS:  Current Outpatient Medications   Medication Sig    eletriptan (RELPAX) 40 MG tablet 1 tab at headache onset; repeat in 1 hour prn    niMODipine (NIMOTOP) 30 MG Cap Take 1 Capsule by mouth 2 times a day.    albuterol 108 (90 Base) MCG/ACT Aero Soln inhalation aerosol Inhale 2 Puffs every 6 hours as needed for Shortness of Breath.    norethindrone (MICRONOR) 0.35 MG tablet      SOCIAL HISTORY:  Social History     Tobacco Use    Smoking status: Never    Smokeless tobacco: Never   Substance Use Topics    Alcohol use: Never     Social History     Social History Narrative    Not on file     FAMILY  "HISTORY:  Family History   Problem Relation Age of Onset    Hypertension Mother     Diabetes Father     Rheumatologic Disease Sister     Autoimmune Disease Sister     Heart Disease Maternal Grandfather     Diabetes Paternal Grandmother     Diabetes Paternal Grandfather     Cancer Neg Hx     Ovarian Cancer Neg Hx     Tubal Cancer Neg Hx     Peritoneal Cancer Neg Hx     Colorectal Cancer Neg Hx     Breast Cancer Neg Hx     Bilateral Breast Cancer Neg Hx     Hyperlipidemia Neg Hx     Stroke Neg Hx    Mother's uncle had AVM and passed      Ambulatory Vitals  Encounter Vitals  Temperature: 36.4 °C (97.5 °F)  Temp src: Temporal  Blood Pressure: 106/68  Pulse: 69  Respiration: 12  Pulse Oximetry: 97 %  Weight: 59.6 kg (131 lb 6.3 oz)  Height: 149.9 cm (4' 11\") (pt reported)  BMI (Calculated): 26.54         REVIEW OF SYSTEMS:  Dizziness with directed light to her eye, shooting pain in scalp to right eye (sometimes left),  She has scalp pain, she has neck tightness and pain. She currently denies nausea, vertigo,vomiting, or balance issues.    PHYSICAL EXAM:  General/Medical:  Korina presents well dressed and clean. She is in no apparent acute distress. She has allodynia of her head with touch, pulling her hair back or washing her hair.  She does report some jaw pain but no claudication.  She has full range of motion of her neck but reports lingering neck pain when she turns her head to the right or left. No malar rash. No upper or lower extremity bruising, rash, or edema. She is able to rise from the chair without assistance or using her arms.  Vital signs are listed above and are within normal limits.      Neuro:  MENTAL STATUS: awake and alert; no deficits of speech or language; oriented to person, place, and time; affect was appropriate to situation. No dysarthria or hypophonia.     CRANIAL NERVES:    II:  fields: intact to confrontation, pupils: NT    V: facial sensation:  NT    VII: facial expression: symmetric    VIII: " hearing: intact to finger rub    IX/X: palate: elevates symmetrically    XI: shoulder shrug: symmetric    XII: tongue: midline    MOTOR:  - bulk:  NT  - tone:  NT  Upper Extremity Strength  (R/L)     NT   Elbow flexion  NT   Elbow extension  NT   Shoulder abduction  NT     Lower Extremity Strength  (R/L)   Hip flexion  NT   Knee extension  NT   Knee flexion  NT   Ankle plantarflexion  NT   Ankle dorsiflexion  NT     - pronator drift:  NT  - abnormal movements: none    SENSATION:  - light touch: NT  - vibration : NT  - temperature:  NT  - Romberg:  NT    COORDINATION:  - finger to nose: NT  - finger tapping:  NT  - Foot tapping:  NT    REFLEXES:  Reflex Right Left   BR  NT  NT   Biceps  NT  NT   Triceps  NT  NT   Patellae  NT  NT   Achilles  NT  NT   Toes  NT  NT     GAIT:  - narrow base and normal, with normal arm swing  - heel-walk: NT  - toe-walk:  NT  - tandem gait:  NT    REVIEW OF IMAGING STUDIES: I reviewed the following studies:  MRI Brain:  Date: 9/11/2024  With or without contrast: With and without  Indication: Change in migraines  Comparison:3/8/24  Impression:  Contrast enhanced brain MRI within normal limits.     REVIEW OF LABORATORY STUDIES:  No pertinent labs    ASSESSMENT & PLAN:  1. Migraine with aura and without status migrainosus, not intractable  She reports better migraine coverage with the use of nimodipine.  Her blood pressure did not tolerate nimodipine twice a day so she is taking nimodipine once a day but reports that her migraines feel much more under control.  She is using eletriptan as a rescue medication which will effectively reduce the severity of her migraine but not kill her migraine.  We discussed switching her rescue medication to naratriptan which may last a little bit longer than eletriptan.  She is agreeable to trying naratriptan in place of eletriptan.  We also discussed samples of Ubrelvy to see if that aborted her migraine better and if it does then we can switch her to  Ubrelvy.  She does report that her migraines feel very hormonal related.  She does also report some breakthrough bleeding and will be following up with her GYN in the next few weeks.  We did discuss switching to different birth controls for better control of her migraines and other symptoms.  She will discuss different treatment options with her GYN when she sees her.  She can contact me via [employer] with any updates, concerns, or questions.  If naratriptan is not effective and she would like to go back to eletriptan she can message me via [employer].  Otherwise she will follow-up with me in 5 months to discuss her current medication regimen and its effect on her migraines.  - naratriptan (AMERGE) 2.5 MG tablet; Take 1 Tablet by mouth one time as needed for Migraine for up to 1 dose. Can re-dose in 2 hours. Max is 2 in 24 hours  Dispense: 9 Tablet; Refill: 3  - Ubrogepant (UBRELVY) 50 MG Tab; Take 50 mg by mouth as needed (migraine). Take one tablet at onset of migraine can re-dose in 2 hours if migraine is not resolved  Dispense: 2 Tablet; Refill: 0       BILLING DOCUMENTATION:   I spent 27 minutes in patient care. This includes time with chart review, pre-charting, records review, discussions with other healthcare providers, and documentation. This also includes face-to-face time with the patient for: exam review, discussion and treatment planning.      Francesca Olivas MSN APRN-CNP  [facility] [place]

## 2024-11-22 ENCOUNTER — OFFICE VISIT (OUTPATIENT)
Dept: NEUROLOGY | Facility: MEDICAL CENTER | Age: 24
End: 2024-11-22
Payer: COMMERCIAL

## 2024-11-22 ENCOUNTER — PHARMACY VISIT (OUTPATIENT)
Dept: PHARMACY | Facility: MEDICAL CENTER | Age: 24
End: 2024-11-22
Payer: COMMERCIAL

## 2024-11-22 VITALS
OXYGEN SATURATION: 97 % | TEMPERATURE: 97.5 F | RESPIRATION RATE: 12 BRPM | BODY MASS INDEX: 26.49 KG/M2 | SYSTOLIC BLOOD PRESSURE: 106 MMHG | HEART RATE: 69 BPM | DIASTOLIC BLOOD PRESSURE: 68 MMHG | WEIGHT: 131.39 LBS | HEIGHT: 59 IN

## 2024-11-22 DIAGNOSIS — G43.109 MIGRAINE WITH AURA AND WITHOUT STATUS MIGRAINOSUS, NOT INTRACTABLE: ICD-10-CM

## 2024-11-22 PROCEDURE — 3078F DIAST BP <80 MM HG: CPT

## 2024-11-22 PROCEDURE — 99213 OFFICE O/P EST LOW 20 MIN: CPT

## 2024-11-22 PROCEDURE — 3074F SYST BP LT 130 MM HG: CPT

## 2024-11-22 PROCEDURE — RXMED WILLOW AMBULATORY MEDICATION CHARGE

## 2024-11-22 PROCEDURE — 99211 OFF/OP EST MAY X REQ PHY/QHP: CPT

## 2024-11-22 RX ORDER — NARATRIPTAN 2.5 MG/1
2.5 TABLET ORAL
Qty: 9 TABLET | Refills: 3 | Status: SHIPPED | OUTPATIENT
Start: 2024-11-22

## 2024-11-22 RX ORDER — UBROGEPANT 50 MG/1
50 TABLET ORAL PRN
Qty: 2 TABLET | Refills: 0 | Status: SHIPPED | OUTPATIENT
Start: 2024-11-22

## 2024-11-22 ASSESSMENT — FIBROSIS 4 INDEX: FIB4 SCORE: 0.26

## 2024-11-22 ASSESSMENT — PATIENT HEALTH QUESTIONNAIRE - PHQ9: CLINICAL INTERPRETATION OF PHQ2 SCORE: 0

## 2024-12-03 ENCOUNTER — HOSPITAL ENCOUNTER (OUTPATIENT)
Dept: LAB | Facility: MEDICAL CENTER | Age: 24
End: 2024-12-03
Attending: NURSE PRACTITIONER
Payer: COMMERCIAL

## 2024-12-03 PROCEDURE — 84443 ASSAY THYROID STIM HORMONE: CPT

## 2024-12-03 PROCEDURE — 36415 COLL VENOUS BLD VENIPUNCTURE: CPT

## 2024-12-03 PROCEDURE — 84439 ASSAY OF FREE THYROXINE: CPT

## 2024-12-04 LAB — TSH SERPL-ACNC: 1.09 UIU/ML (ref 0.35–5.5)

## 2024-12-07 LAB — T4 FREE SERPL DIALY-MCNC: 1.8 NG/DL (ref 1.1–2.4)

## 2024-12-10 ENCOUNTER — HOSPITAL ENCOUNTER (OUTPATIENT)
Dept: RADIOLOGY | Facility: MEDICAL CENTER | Age: 24
End: 2024-12-10
Attending: OBSTETRICS & GYNECOLOGY
Payer: COMMERCIAL

## 2024-12-10 DIAGNOSIS — N93.0 POSTCOITAL BLEEDING: ICD-10-CM

## 2024-12-10 DIAGNOSIS — N92.6 IRREGULAR MENSTRUAL CYCLE: ICD-10-CM

## 2024-12-10 PROCEDURE — 76830 TRANSVAGINAL US NON-OB: CPT

## 2024-12-15 ENCOUNTER — HOSPITAL ENCOUNTER (OUTPATIENT)
Facility: MEDICAL CENTER | Age: 24
End: 2024-12-15
Attending: FAMILY MEDICINE
Payer: COMMERCIAL

## 2024-12-15 ENCOUNTER — OFFICE VISIT (OUTPATIENT)
Dept: URGENT CARE | Facility: PHYSICIAN GROUP | Age: 24
End: 2024-12-15
Payer: COMMERCIAL

## 2024-12-15 VITALS
HEART RATE: 88 BPM | RESPIRATION RATE: 13 BRPM | OXYGEN SATURATION: 100 % | BODY MASS INDEX: 26.21 KG/M2 | DIASTOLIC BLOOD PRESSURE: 68 MMHG | WEIGHT: 130 LBS | HEIGHT: 59 IN | SYSTOLIC BLOOD PRESSURE: 114 MMHG | TEMPERATURE: 97.7 F

## 2024-12-15 DIAGNOSIS — R10.2 PELVIC PAIN: ICD-10-CM

## 2024-12-15 LAB
APPEARANCE UR: CLEAR
BILIRUB UR STRIP-MCNC: NORMAL MG/DL
COLOR UR AUTO: YELLOW
GLUCOSE UR STRIP.AUTO-MCNC: NORMAL MG/DL
KETONES UR STRIP.AUTO-MCNC: NORMAL MG/DL
LEUKOCYTE ESTERASE UR QL STRIP.AUTO: NORMAL
NITRITE UR QL STRIP.AUTO: NORMAL
PH UR STRIP.AUTO: 6 [PH] (ref 5–8)
POCT INT CON NEG: NEGATIVE
POCT INT CON POS: POSITIVE
POCT URINE PREGNANCY TEST: NEGATIVE
PROT UR QL STRIP: NORMAL MG/DL
RBC UR QL AUTO: NORMAL
SP GR UR STRIP.AUTO: 1.02
UROBILINOGEN UR STRIP-MCNC: 0.2 MG/DL

## 2024-12-15 PROCEDURE — 87077 CULTURE AEROBIC IDENTIFY: CPT

## 2024-12-15 PROCEDURE — 87086 URINE CULTURE/COLONY COUNT: CPT

## 2024-12-15 PROCEDURE — 3078F DIAST BP <80 MM HG: CPT | Performed by: FAMILY MEDICINE

## 2024-12-15 PROCEDURE — 81025 URINE PREGNANCY TEST: CPT | Performed by: FAMILY MEDICINE

## 2024-12-15 PROCEDURE — 3074F SYST BP LT 130 MM HG: CPT | Performed by: FAMILY MEDICINE

## 2024-12-15 PROCEDURE — 99214 OFFICE O/P EST MOD 30 MIN: CPT | Performed by: FAMILY MEDICINE

## 2024-12-15 PROCEDURE — 81002 URINALYSIS NONAUTO W/O SCOPE: CPT | Performed by: FAMILY MEDICINE

## 2024-12-15 ASSESSMENT — FIBROSIS 4 INDEX: FIB4 SCORE: 0.26

## 2024-12-15 NOTE — PROGRESS NOTES
"  Subjective:      24 y.o. female presents to urgent care for pelvic pain that started in September. She has been following with her gynecologist for this. She had pelvic ultrasound 12/10/2024. Unfortunately about 2-3 days ago symptoms worsened without inciting event or trauma. the pain is intermittent, it comes with certain movements, it's described as shooting and cramping, currently rated 4/10. She has been using ibuprofen with minimal relief in symptoms. Last bowel movement was this morning and was normal.  No changes to urinary urgency, frequency, dysuria, or hematuria.  She is currently sexually active with one, male partner and they use OCPs for contraception.  She denies any vaginal discharge, odors, lesions, or rashes.  No recent travel, antibiotic use, change in diet, or exposure to exotic animals.  She has had prior appendectomy, otherwise no history of abdominal surgery.    She denies any other questions or concerns at this time.    Current problem list, medication, and past medical/surgical history were reviewed in Epic.    ROS  See HPI     Objective:      /68 (BP Location: Right arm, Patient Position: Sitting, BP Cuff Size: Adult)   Pulse 88   Temp 36.5 °C (97.7 °F) (Temporal)   Resp 13   Ht 1.499 m (4' 11\")   Wt 59 kg (130 lb)   SpO2 100%   BMI 26.26 kg/m²     Physical Exam  Constitutional:       General: She is not in acute distress.     Appearance: She is not diaphoretic.   Cardiovascular:      Rate and Rhythm: Normal rate and regular rhythm.      Heart sounds: Normal heart sounds.   Pulmonary:      Effort: Pulmonary effort is normal. No respiratory distress.      Breath sounds: Normal breath sounds.   Abdominal:      General: Bowel sounds are normal.      Palpations: Abdomen is soft.      Tenderness: There is abdominal tenderness (suprapubic). There is no right CVA tenderness or left CVA tenderness.   Neurological:      Mental Status: She is alert.   Psychiatric:         Mood and " Affect: Affect normal.         Judgment: Judgment normal.       Assessment/Plan:     1. Pelvic pain  hCG negative.  Questionable infection on urinalysis, will send for urine culture.  Pelvic ultrasound from 12/10/2024 was reviewed, it did show thickened endometrial stripe with possible right ovarian cyst.  She was encouraged to use Tylenol and ibuprofen as needed for pain.  Follow-up with gynecology.  - POCT Urinalysis  - POCT PREGNANCY  - URINE CULTURE(NEW); Future      Instructed to return to Urgent Care or nearest Emergency Department if symptoms fail to improve, for any change in condition, further concerns, or new concerning symptoms. Patient states understanding of the plan of care and discharge instructions.    Katey Darden M.D.

## 2024-12-30 ENCOUNTER — TELEPHONE (OUTPATIENT)
Dept: NEUROLOGY | Facility: MEDICAL CENTER | Age: 24
End: 2024-12-30
Payer: COMMERCIAL

## 2024-12-30 NOTE — TELEPHONE ENCOUNTER
Patient called in stating the naratriptan is not working for her and that she would like to go back to the elatriptan and try Ajovy. Gabriela Damon, Med Ass't

## 2024-12-31 DIAGNOSIS — G43.109 MIGRAINE WITH AURA AND WITHOUT STATUS MIGRAINOSUS, NOT INTRACTABLE: ICD-10-CM

## 2024-12-31 RX ORDER — ELETRIPTAN HYDROBROMIDE 40 MG/1
40 TABLET, FILM COATED ORAL
Qty: 9 TABLET | Refills: 11 | Status: SHIPPED | OUTPATIENT
Start: 2024-12-31

## 2025-04-16 ENCOUNTER — HOSPITAL ENCOUNTER (OUTPATIENT)
Dept: LAB | Facility: MEDICAL CENTER | Age: 25
End: 2025-04-16
Attending: OBSTETRICS & GYNECOLOGY
Payer: COMMERCIAL

## 2025-04-16 LAB — B-HCG SERPL-ACNC: ABNORMAL MIU/ML (ref 0–5)

## 2025-04-16 PROCEDURE — 36415 COLL VENOUS BLD VENIPUNCTURE: CPT

## 2025-04-16 PROCEDURE — 84702 CHORIONIC GONADOTROPIN TEST: CPT

## 2025-04-18 ENCOUNTER — HOSPITAL ENCOUNTER (OUTPATIENT)
Dept: LAB | Facility: MEDICAL CENTER | Age: 25
End: 2025-04-18
Attending: OBSTETRICS & GYNECOLOGY
Payer: COMMERCIAL

## 2025-04-18 PROCEDURE — 84702 CHORIONIC GONADOTROPIN TEST: CPT

## 2025-04-18 PROCEDURE — 36415 COLL VENOUS BLD VENIPUNCTURE: CPT

## 2025-04-19 LAB — B-HCG SERPL-ACNC: ABNORMAL MIU/ML (ref 0–5)

## 2025-05-15 ENCOUNTER — APPOINTMENT (OUTPATIENT)
Dept: MEDICAL GROUP | Facility: PHYSICIAN GROUP | Age: 25
End: 2025-05-15
Payer: COMMERCIAL

## 2025-05-19 ENCOUNTER — HOSPITAL ENCOUNTER (OUTPATIENT)
Dept: LAB | Facility: MEDICAL CENTER | Age: 25
End: 2025-05-19
Attending: OBSTETRICS & GYNECOLOGY
Payer: COMMERCIAL

## 2025-05-19 LAB — B-HCG SERPL-ACNC: 6547 MIU/ML (ref 0–5)

## 2025-05-19 PROCEDURE — 84702 CHORIONIC GONADOTROPIN TEST: CPT

## 2025-05-19 PROCEDURE — 36415 COLL VENOUS BLD VENIPUNCTURE: CPT

## 2025-05-25 ENCOUNTER — APPOINTMENT (OUTPATIENT)
Dept: RADIOLOGY | Facility: MEDICAL CENTER | Age: 25
End: 2025-05-25
Attending: EMERGENCY MEDICINE
Payer: COMMERCIAL

## 2025-05-25 ENCOUNTER — HOSPITAL ENCOUNTER (EMERGENCY)
Facility: MEDICAL CENTER | Age: 25
End: 2025-05-25
Attending: EMERGENCY MEDICINE
Payer: COMMERCIAL

## 2025-05-25 VITALS
HEIGHT: 59 IN | RESPIRATION RATE: 16 BRPM | WEIGHT: 133.82 LBS | HEART RATE: 79 BPM | BODY MASS INDEX: 26.98 KG/M2 | OXYGEN SATURATION: 96 % | TEMPERATURE: 97.6 F | DIASTOLIC BLOOD PRESSURE: 59 MMHG | SYSTOLIC BLOOD PRESSURE: 92 MMHG

## 2025-05-25 DIAGNOSIS — O03.9 MISCARRIAGE: Primary | ICD-10-CM

## 2025-05-25 DIAGNOSIS — D50.0 BLOOD LOSS ANEMIA: ICD-10-CM

## 2025-05-25 LAB
ALBUMIN SERPL BCP-MCNC: 4.1 G/DL (ref 3.2–4.9)
ALBUMIN/GLOB SERPL: 1.5 G/DL
ALP SERPL-CCNC: 76 U/L (ref 30–99)
ALT SERPL-CCNC: 18 U/L (ref 2–50)
ANION GAP SERPL CALC-SCNC: 10 MMOL/L (ref 7–16)
AST SERPL-CCNC: 18 U/L (ref 12–45)
B-HCG SERPL-ACNC: 1787 MIU/ML (ref 0–5)
BASOPHILS # BLD AUTO: 0.4 % (ref 0–1.8)
BASOPHILS # BLD: 0.05 K/UL (ref 0–0.12)
BILIRUB SERPL-MCNC: <0.2 MG/DL (ref 0.1–1.5)
BUN SERPL-MCNC: 12 MG/DL (ref 8–22)
CALCIUM ALBUM COR SERPL-MCNC: 8.8 MG/DL (ref 8.5–10.5)
CALCIUM SERPL-MCNC: 8.9 MG/DL (ref 8.5–10.5)
CHLORIDE SERPL-SCNC: 104 MMOL/L (ref 96–112)
CO2 SERPL-SCNC: 21 MMOL/L (ref 20–33)
CREAT SERPL-MCNC: 0.67 MG/DL (ref 0.5–1.4)
EOSINOPHIL # BLD AUTO: 0.15 K/UL (ref 0–0.51)
EOSINOPHIL NFR BLD: 1.2 % (ref 0–6.9)
ERYTHROCYTE [DISTWIDTH] IN BLOOD BY AUTOMATED COUNT: 40.9 FL (ref 35.9–50)
GFR SERPLBLD CREATININE-BSD FMLA CKD-EPI: 123 ML/MIN/1.73 M 2
GLOBULIN SER CALC-MCNC: 2.8 G/DL (ref 1.9–3.5)
GLUCOSE SERPL-MCNC: 131 MG/DL (ref 65–99)
HCG SERPL QL: POSITIVE
HCT VFR BLD AUTO: 32.8 % (ref 37–47)
HGB BLD-MCNC: 10.9 G/DL (ref 12–16)
IMM GRANULOCYTES # BLD AUTO: 0.06 K/UL (ref 0–0.11)
IMM GRANULOCYTES NFR BLD AUTO: 0.5 % (ref 0–0.9)
LIPASE SERPL-CCNC: 30 U/L (ref 11–82)
LYMPHOCYTES # BLD AUTO: 1.88 K/UL (ref 1–4.8)
LYMPHOCYTES NFR BLD: 14.9 % (ref 22–41)
MCH RBC QN AUTO: 29.6 PG (ref 27–33)
MCHC RBC AUTO-ENTMCNC: 33.2 G/DL (ref 32.2–35.5)
MCV RBC AUTO: 89.1 FL (ref 81.4–97.8)
MONOCYTES # BLD AUTO: 0.48 K/UL (ref 0–0.85)
MONOCYTES NFR BLD AUTO: 3.8 % (ref 0–13.4)
NEUTROPHILS # BLD AUTO: 10.02 K/UL (ref 1.82–7.42)
NEUTROPHILS NFR BLD: 79.2 % (ref 44–72)
NRBC # BLD AUTO: 0 K/UL
NRBC BLD-RTO: 0 /100 WBC (ref 0–0.2)
PLATELET # BLD AUTO: 355 K/UL (ref 164–446)
PMV BLD AUTO: 9.3 FL (ref 9–12.9)
POTASSIUM SERPL-SCNC: 4.2 MMOL/L (ref 3.6–5.5)
PROT SERPL-MCNC: 6.9 G/DL (ref 6–8.2)
RBC # BLD AUTO: 3.68 M/UL (ref 4.2–5.4)
SODIUM SERPL-SCNC: 135 MMOL/L (ref 135–145)
WBC # BLD AUTO: 12.6 K/UL (ref 4.8–10.8)

## 2025-05-25 PROCEDURE — 99284 EMERGENCY DEPT VISIT MOD MDM: CPT

## 2025-05-25 PROCEDURE — 36415 COLL VENOUS BLD VENIPUNCTURE: CPT

## 2025-05-25 PROCEDURE — 85025 COMPLETE CBC W/AUTO DIFF WBC: CPT

## 2025-05-25 PROCEDURE — 84703 CHORIONIC GONADOTROPIN ASSAY: CPT

## 2025-05-25 PROCEDURE — 84702 CHORIONIC GONADOTROPIN TEST: CPT

## 2025-05-25 PROCEDURE — 76817 TRANSVAGINAL US OBSTETRIC: CPT

## 2025-05-25 PROCEDURE — 83690 ASSAY OF LIPASE: CPT

## 2025-05-25 PROCEDURE — 80053 COMPREHEN METABOLIC PANEL: CPT

## 2025-05-25 ASSESSMENT — PAIN DESCRIPTION - PAIN TYPE: TYPE: ACUTE PAIN

## 2025-05-25 ASSESSMENT — FIBROSIS 4 INDEX: FIB4 SCORE: 0.26

## 2025-05-25 NOTE — ED TRIAGE NOTES
"Chief Complaint   Patient presents with    Vaginal Bleeding     Pt was seen at [facility] on 5/15 and told she was having a miscarriage. Pt started having heavy vaginal bleeding last night with large blood clots soaking through a pad and a half an hours. Pt had syncopal episode this morning.          Pt ambulated to triage for above complaint.  Pt is AO x 4, follows commands, and responds appropriately to questions. Patient's breathing is unlabored and pain is currently 2/10 on the 0-10 pain scale.  Pt placed in lobby. Patient educated on triage process and encouraged to alert staff for any changes.      /62   Pulse 89   Temp 36.4 °C (97.6 °F) (Temporal)   Resp 16   Ht 1.499 m (4' 11\")   Wt 60.7 kg (133 lb 13.1 oz)   SpO2 99%     " 16

## 2025-05-25 NOTE — DISCHARGE INSTRUCTIONS
Dr. Meléndez and the OB group is aware of what is going on.  Follow-up on Tuesday to discuss further options to include continuing to wait, medications, or surgery.  If things worsen in the meantime (increasing bleeding/lightheadedness, fever, worsening pain) return to the ER.

## 2025-05-25 NOTE — ED PROVIDER NOTES
ED Provider Note    CHIEF COMPLAINT  Chief Complaint   Patient presents with    Vaginal Bleeding     [facility] and told she was having a miscarriage. Pt started having heavy vaginal bleeding last night with large blood clots soaking through a pad and a half an hours. Pt had syncopal episode this morning.        EXTERNAL RECORDS REVIEWED  Outpatient Notes she follows with neurology for migraines, I do not have access to her OB notes and Outpatient labs & studies hCG trends are noted 11,000 -15,000-6500    HPI/ROS  LIMITATION TO HISTORY   Select: : None      Korina Potts is a G3, P2 at roughly 11 weeks by dates 24 y.o. female who presents with vaginal bleeding and she complained of lightheadedness.  Patient states she had a early IUP with her first ultrasound at 6 weeks, and then on the 15th she had an ultrasound showing no viability at 10 weeks.  She had an hCG soon after that which showed a decrease.  She did not start bleeding till the 22nd.  It was rather heavy overnight, this morning after going the bathroom she fell like she passed out.  She is been feeling lightheaded upon standing.  The bleeding was heavy at the about 6:00 this morning but is subsequently since tapered off.  She has some occasional crampy abdominal pain which comes and goes.  Nothing makes it better, the makes it worse.  No fever or chills.  There is no other complaint.    PAST MEDICAL HISTORY   has a past medical history of Asthma and Migraine.    SURGICAL HISTORY   has a past surgical history that includes appendectomy.    FAMILY HISTORY  Family History   Problem Relation Age of Onset    Hypertension Mother     Diabetes Father     Rheumatologic Disease Sister     Autoimmune Disease Sister     Heart Disease Maternal Grandfather     Diabetes Paternal Grandmother     Diabetes Paternal Grandfather     Cancer Neg Hx     Ovarian Cancer Neg Hx     Tubal Cancer Neg Hx     Peritoneal Cancer Neg Hx     Colorectal Cancer  "Neg Hx     Breast Cancer Neg Hx     Bilateral Breast Cancer Neg Hx     Hyperlipidemia Neg Hx     Stroke Neg Hx        SOCIAL HISTORY  Social History     Tobacco Use    Smoking status: Never    Smokeless tobacco: Never   Vaping Use    Vaping status: Never Used   Substance and Sexual Activity    Alcohol use: Never    Drug use: Never    Sexual activity: Yes     Partners: Male     Birth control/protection: Pill       CURRENT MEDICATIONS  Home Medications    **Home medications have not yet been reviewed for this encounter**         ALLERGIES  Allergies[1]    PHYSICAL EXAM  VITAL SIGNS: /62   Pulse 89   Temp 36.4 °C (97.6 °F) (Temporal)   Resp 16   Ht 1.499 m (4' 11\")   Wt 60.7 kg (133 lb 13.1 oz)   SpO2 99%   BMI 27.03 kg/m²    Constitutional: Well appearing patient in no acute distress.  HENT: Head is without trauma.  Oropharynx is clear.  Mucous membranes are moist.  Eyes: Sclerae are nonicteric, pupils are equally round.  Neck: Supple with grossly normal range of motion.  Cardiovascular: Heart is regular rate and rhythm without murmur rub or gallop.  Peripheral pulses are intact and symmetric throughout.  Thorax & Lungs: Breathing easily.  Good air movement.  There is no wheeze, rhonchi or rales.  Abdomen: Bowel sounds normal, soft, non-distended, nontender, no mass nor pulsatile mass. I do not appreciate hepatosplenomegaly.  Skin: No apparent rash.  I do not see petechiae or purpura.  Extremities: No evidence of acute trauma.  No clubbing, cyanosis, edema, no Homans or cords.  Neurologic: Alert. Moving all extremities.  Intact sensation and strength throughout.    Psychiatric: Normal for situation      EKG/LABS  Labs Reviewed   CBC WITH DIFFERENTIAL - Abnormal; Notable for the following components:       Result Value    WBC 12.6 (*)     RBC 3.68 (*)     Hemoglobin 10.9 (*)     Hematocrit 32.8 (*)     Neutrophils-Polys 79.20 (*)     Lymphocytes 14.90 (*)     Neutrophils (Absolute) 10.02 (*)     All other " components within normal limits   COMP METABOLIC PANEL - Abnormal; Notable for the following components:    Glucose 131 (*)     All other components within normal limits   HCG QUAL SERUM - Abnormal; Notable for the following components:    Beta-Hcg Qualitative Serum Positive (*)     All other components within normal limits   HCG QUANTITATIVE - Abnormal; Notable for the following components:    Bhcg 1787.0 (*)     All other components within normal limits   LIPASE   ESTIMATED GFR       I have independently interpreted this EKG    RADIOLOGY/PROCEDURES   I have independently interpreted the diagnostic imaging associated with this visit and am waiting the final reading from the radiologist.   My preliminary interpretation is as follows:     Radiologist interpretation:  US-OB 1ST TRIMESTER WITH TRANSVAGINAL (COMBO)   Final Result         1. Irregular intrauterine gestational sac of approximately 7 weeks 1 days size with no embryo or yolk sac identified. The findings could relate to probable early intrauterine pregnancy or early embryonic demise.      There is a 2.2 x 0.8 cm subchorionic hemorrhage.      Continued follow-up of serum beta HCG levels and repeat ultrasound is recommended.          COURSE & MEDICAL DECISION MAKING    ASSESSMENT, COURSE AND PLAN  Care Narrative: This patient presents with vaginal bleeding with what sounds to be an individual miscarriage.  The been following her conservatively.  She is lightheaded, which is likely from anemia.  Vital signs however normal.  Her bleeding has not been heavy while here.  Laboratories returned showing hematocrit low at 33, her hCG continues to fall.  Ultrasound shows irregular intrauterine gestational sac suspicious for either early IUP or demise.  Patient is updated of these results, and I placed a call to her OB.      While here, the patient has changed her pad once, and it was not saturated.      I talked to Dr. Meléndez.  At this point in consultation with the  patient, she is comfortable with the patient going home with follow-up.  In the office they will talk about continuing conservative approach, medications, or surgery.  We discussed specific return precautions such as fever, or than 3 pads per hour for 2 hours in a row, increasing dizziness or any turn for the worse.  Dr. Meléndez will be on-call until 7 AM tomorrow.  She will also be notifying Dr. Ron of the patient.  Instructions on miscarriage.    I do note the trend of her blood pressure, however the patient is not symptomatic, pulse rate is gone down, and she is not orthostatic at discharge.      DISPOSITION AND DISCUSSIONS  I have discussed management of the patient with the following physicians and ALICIA's: Gynecology    Escalation of care considered, and ultimately not performed:acute inpatient care management, however at this time, the patient is most appropriate for outpatient management    Decision tools and prescription drugs considered including, but not limited to: .    FINAL DIAGNOSIS  1. Miscarriage    2. Blood loss anemia         Electronically signed by: Bhavesh Pemberton M.D., 5/25/2025 10:42 AM           [1] No Known Allergies

## 2025-05-28 ENCOUNTER — HOSPITAL ENCOUNTER (OUTPATIENT)
Facility: MEDICAL CENTER | Age: 25
End: 2025-05-28
Attending: OBSTETRICS & GYNECOLOGY
Payer: COMMERCIAL

## 2025-05-28 LAB
B-HCG SERPL-ACNC: 1217 MIU/ML (ref 0–5)
BASOPHILS # BLD AUTO: 0.7 % (ref 0–1.8)
BASOPHILS # BLD: 0.05 K/UL (ref 0–0.12)
EOSINOPHIL # BLD AUTO: 0.2 K/UL (ref 0–0.51)
EOSINOPHIL NFR BLD: 2.9 % (ref 0–6.9)
ERYTHROCYTE [DISTWIDTH] IN BLOOD BY AUTOMATED COUNT: 42.6 FL (ref 35.9–50)
HCT VFR BLD AUTO: 26.3 % (ref 37–47)
HGB BLD-MCNC: 8.3 G/DL (ref 12–16)
IMM GRANULOCYTES # BLD AUTO: 0.01 K/UL (ref 0–0.11)
IMM GRANULOCYTES NFR BLD AUTO: 0.1 % (ref 0–0.9)
LYMPHOCYTES # BLD AUTO: 2.1 K/UL (ref 1–4.8)
LYMPHOCYTES NFR BLD: 30.2 % (ref 22–41)
MCH RBC QN AUTO: 29 PG (ref 27–33)
MCHC RBC AUTO-ENTMCNC: 31.6 G/DL (ref 32.2–35.5)
MCV RBC AUTO: 92 FL (ref 81.4–97.8)
MONOCYTES # BLD AUTO: 0.43 K/UL (ref 0–0.85)
MONOCYTES NFR BLD AUTO: 6.2 % (ref 0–13.4)
NEUTROPHILS # BLD AUTO: 4.17 K/UL (ref 1.82–7.42)
NEUTROPHILS NFR BLD: 59.9 % (ref 44–72)
NRBC # BLD AUTO: 0 K/UL
NRBC BLD-RTO: 0 /100 WBC (ref 0–0.2)
PLATELET # BLD AUTO: 351 K/UL (ref 164–446)
PMV BLD AUTO: 9.7 FL (ref 9–12.9)
RBC # BLD AUTO: 2.86 M/UL (ref 4.2–5.4)
WBC # BLD AUTO: 7 K/UL (ref 4.8–10.8)

## 2025-05-28 PROCEDURE — 84702 CHORIONIC GONADOTROPIN TEST: CPT

## 2025-05-28 PROCEDURE — 85025 COMPLETE CBC W/AUTO DIFF WBC: CPT

## 2025-05-29 ENCOUNTER — HOSPITAL ENCOUNTER (EMERGENCY)
Facility: MEDICAL CENTER | Age: 25
End: 2025-05-30
Attending: EMERGENCY MEDICINE
Payer: COMMERCIAL

## 2025-05-29 ENCOUNTER — ANESTHESIA (OUTPATIENT)
Dept: SURGERY | Facility: MEDICAL CENTER | Age: 25
End: 2025-05-29
Payer: COMMERCIAL

## 2025-05-29 ENCOUNTER — ANESTHESIA EVENT (OUTPATIENT)
Dept: SURGERY | Facility: MEDICAL CENTER | Age: 25
End: 2025-05-29
Payer: COMMERCIAL

## 2025-05-29 DIAGNOSIS — O03.9 MISCARRIAGE: ICD-10-CM

## 2025-05-29 DIAGNOSIS — D64.9 ANEMIA, UNSPECIFIED TYPE: Primary | ICD-10-CM

## 2025-05-29 LAB
ABO GROUP BLD: NORMAL
ALBUMIN SERPL BCP-MCNC: 4.2 G/DL (ref 3.2–4.9)
ALBUMIN/GLOB SERPL: 1.4 G/DL
ALP SERPL-CCNC: 75 U/L (ref 30–99)
ALT SERPL-CCNC: 20 U/L (ref 2–50)
ANION GAP SERPL CALC-SCNC: 12 MMOL/L (ref 7–16)
AST SERPL-CCNC: 22 U/L (ref 12–45)
B-HCG SERPL-ACNC: 1268 MIU/ML (ref 0–5)
BASOPHILS # BLD AUTO: 0.4 % (ref 0–1.8)
BASOPHILS # BLD: 0.04 K/UL (ref 0–0.12)
BILIRUB SERPL-MCNC: <0.2 MG/DL (ref 0.1–1.5)
BLD GP AB SCN SERPL QL: NORMAL
BUN SERPL-MCNC: 12 MG/DL (ref 8–22)
CALCIUM ALBUM COR SERPL-MCNC: 8.5 MG/DL (ref 8.5–10.5)
CALCIUM SERPL-MCNC: 8.7 MG/DL (ref 8.5–10.5)
CHLORIDE SERPL-SCNC: 105 MMOL/L (ref 96–112)
CO2 SERPL-SCNC: 20 MMOL/L (ref 20–33)
CREAT SERPL-MCNC: 0.61 MG/DL (ref 0.5–1.4)
EOSINOPHIL # BLD AUTO: 0.23 K/UL (ref 0–0.51)
EOSINOPHIL NFR BLD: 2.4 % (ref 0–6.9)
ERYTHROCYTE [DISTWIDTH] IN BLOOD BY AUTOMATED COUNT: 42 FL (ref 35.9–50)
GFR SERPLBLD CREATININE-BSD FMLA CKD-EPI: 126 ML/MIN/1.73 M 2
GLOBULIN SER CALC-MCNC: 2.9 G/DL (ref 1.9–3.5)
GLUCOSE SERPL-MCNC: 95 MG/DL (ref 65–99)
HCG SERPL QL: POSITIVE
HCT VFR BLD AUTO: 26.7 % (ref 37–47)
HGB BLD-MCNC: 8.6 G/DL (ref 12–16)
IMM GRANULOCYTES # BLD AUTO: 0.03 K/UL (ref 0–0.11)
IMM GRANULOCYTES NFR BLD AUTO: 0.3 % (ref 0–0.9)
LIPASE SERPL-CCNC: 38 U/L (ref 11–82)
LYMPHOCYTES # BLD AUTO: 2.79 K/UL (ref 1–4.8)
LYMPHOCYTES NFR BLD: 29.1 % (ref 22–41)
MCH RBC QN AUTO: 29.1 PG (ref 27–33)
MCHC RBC AUTO-ENTMCNC: 32.2 G/DL (ref 32.2–35.5)
MCV RBC AUTO: 90.2 FL (ref 81.4–97.8)
MONOCYTES # BLD AUTO: 0.46 K/UL (ref 0–0.85)
MONOCYTES NFR BLD AUTO: 4.8 % (ref 0–13.4)
NEUTROPHILS # BLD AUTO: 6.03 K/UL (ref 1.82–7.42)
NEUTROPHILS NFR BLD: 63 % (ref 44–72)
NRBC # BLD AUTO: 0 K/UL
NRBC BLD-RTO: 0 /100 WBC (ref 0–0.2)
PLATELET # BLD AUTO: 379 K/UL (ref 164–446)
PMV BLD AUTO: 8.7 FL (ref 9–12.9)
POTASSIUM SERPL-SCNC: 3.7 MMOL/L (ref 3.6–5.5)
PROT SERPL-MCNC: 7.1 G/DL (ref 6–8.2)
RBC # BLD AUTO: 2.96 M/UL (ref 4.2–5.4)
RH BLD: NORMAL
SODIUM SERPL-SCNC: 137 MMOL/L (ref 135–145)
WBC # BLD AUTO: 9.6 K/UL (ref 4.8–10.8)

## 2025-05-29 PROCEDURE — 700105 HCHG RX REV CODE 258: Performed by: STUDENT IN AN ORGANIZED HEALTH CARE EDUCATION/TRAINING PROGRAM

## 2025-05-29 PROCEDURE — 160046 HCHG PACU - 1ST 60 MINS PHASE II: Performed by: OBSTETRICS & GYNECOLOGY

## 2025-05-29 PROCEDURE — 160025 RECOVERY II MINUTES (STATS): Performed by: OBSTETRICS & GYNECOLOGY

## 2025-05-29 PROCEDURE — 160009 HCHG ANES TIME/MIN: Performed by: OBSTETRICS & GYNECOLOGY

## 2025-05-29 PROCEDURE — 700111 HCHG RX REV CODE 636 W/ 250 OVERRIDE (IP)

## 2025-05-29 PROCEDURE — 83690 ASSAY OF LIPASE: CPT

## 2025-05-29 PROCEDURE — 700101 HCHG RX REV CODE 250: Performed by: STUDENT IN AN ORGANIZED HEALTH CARE EDUCATION/TRAINING PROGRAM

## 2025-05-29 PROCEDURE — 160029 HCHG SURGERY MINUTES - 1ST 30 MINS LEVEL 4: Performed by: OBSTETRICS & GYNECOLOGY

## 2025-05-29 PROCEDURE — 85025 COMPLETE CBC W/AUTO DIFF WBC: CPT

## 2025-05-29 PROCEDURE — 84702 CHORIONIC GONADOTROPIN TEST: CPT

## 2025-05-29 PROCEDURE — 88305 TISSUE EXAM BY PATHOLOGIST: CPT | Mod: 26 | Performed by: PATHOLOGY

## 2025-05-29 PROCEDURE — 160002 HCHG RECOVERY MINUTES (STAT): Performed by: OBSTETRICS & GYNECOLOGY

## 2025-05-29 PROCEDURE — 160035 HCHG PACU - 1ST 60 MINS PHASE I: Performed by: OBSTETRICS & GYNECOLOGY

## 2025-05-29 PROCEDURE — 160048 HCHG OR STATISTICAL LEVEL 1-5: Performed by: OBSTETRICS & GYNECOLOGY

## 2025-05-29 PROCEDURE — 160041 HCHG SURGERY MINUTES - EA ADDL 1 MIN LEVEL 4: Performed by: OBSTETRICS & GYNECOLOGY

## 2025-05-29 PROCEDURE — 160015 HCHG STAT PREOP MINUTES: Performed by: OBSTETRICS & GYNECOLOGY

## 2025-05-29 PROCEDURE — 80053 COMPREHEN METABOLIC PANEL: CPT

## 2025-05-29 PROCEDURE — 84703 CHORIONIC GONADOTROPIN ASSAY: CPT

## 2025-05-29 PROCEDURE — 36415 COLL VENOUS BLD VENIPUNCTURE: CPT

## 2025-05-29 PROCEDURE — 86900 BLOOD TYPING SEROLOGIC ABO: CPT

## 2025-05-29 PROCEDURE — 160047 HCHG PACU  - EA ADDL 30 MINS PHASE II: Performed by: OBSTETRICS & GYNECOLOGY

## 2025-05-29 PROCEDURE — 99291 CRITICAL CARE FIRST HOUR: CPT

## 2025-05-29 PROCEDURE — 700111 HCHG RX REV CODE 636 W/ 250 OVERRIDE (IP): Performed by: STUDENT IN AN ORGANIZED HEALTH CARE EDUCATION/TRAINING PROGRAM

## 2025-05-29 PROCEDURE — 88305 TISSUE EXAM BY PATHOLOGIST: CPT | Performed by: PATHOLOGY

## 2025-05-29 PROCEDURE — RXMED WILLOW AMBULATORY MEDICATION CHARGE: Performed by: OBSTETRICS & GYNECOLOGY

## 2025-05-29 PROCEDURE — 86901 BLOOD TYPING SEROLOGIC RH(D): CPT

## 2025-05-29 PROCEDURE — 86850 RBC ANTIBODY SCREEN: CPT

## 2025-05-29 RX ORDER — ONDANSETRON 2 MG/ML
4 INJECTION INTRAMUSCULAR; INTRAVENOUS
Status: DISCONTINUED | OUTPATIENT
Start: 2025-05-29 | End: 2025-05-30 | Stop reason: HOSPADM

## 2025-05-29 RX ORDER — OXYCODONE HCL 5 MG/5 ML
5 SOLUTION, ORAL ORAL
Status: DISCONTINUED | OUTPATIENT
Start: 2025-05-29 | End: 2025-05-30 | Stop reason: HOSPADM

## 2025-05-29 RX ORDER — DOXYCYCLINE 100 MG/1
100 CAPSULE ORAL 2 TIMES DAILY
Qty: 14 CAPSULE | Refills: 0 | Status: SHIPPED | OUTPATIENT
Start: 2025-05-29 | End: 2025-06-06

## 2025-05-29 RX ORDER — DEXAMETHASONE SODIUM PHOSPHATE 4 MG/ML
INJECTION, SOLUTION INTRA-ARTICULAR; INTRALESIONAL; INTRAMUSCULAR; INTRAVENOUS; SOFT TISSUE PRN
Status: DISCONTINUED | OUTPATIENT
Start: 2025-05-29 | End: 2025-05-29 | Stop reason: SURG

## 2025-05-29 RX ORDER — HALOPERIDOL 5 MG/ML
1 INJECTION INTRAMUSCULAR
Status: DISCONTINUED | OUTPATIENT
Start: 2025-05-29 | End: 2025-05-30 | Stop reason: HOSPADM

## 2025-05-29 RX ORDER — HYDROMORPHONE HYDROCHLORIDE 1 MG/ML
0.2 INJECTION, SOLUTION INTRAMUSCULAR; INTRAVENOUS; SUBCUTANEOUS
Status: DISCONTINUED | OUTPATIENT
Start: 2025-05-29 | End: 2025-05-30 | Stop reason: HOSPADM

## 2025-05-29 RX ORDER — OXYCODONE HCL 5 MG/5 ML
10 SOLUTION, ORAL ORAL
Status: DISCONTINUED | OUTPATIENT
Start: 2025-05-29 | End: 2025-05-30 | Stop reason: HOSPADM

## 2025-05-29 RX ORDER — LIDOCAINE HYDROCHLORIDE 20 MG/ML
INJECTION, SOLUTION EPIDURAL; INFILTRATION; INTRACAUDAL; PERINEURAL PRN
Status: DISCONTINUED | OUTPATIENT
Start: 2025-05-29 | End: 2025-05-30 | Stop reason: HOSPADM

## 2025-05-29 RX ORDER — ACETAMINOPHEN 500 MG
1000 TABLET ORAL ONCE
Status: DISCONTINUED | OUTPATIENT
Start: 2025-05-29 | End: 2025-05-29 | Stop reason: HOSPADM

## 2025-05-29 RX ORDER — ONDANSETRON 2 MG/ML
INJECTION INTRAMUSCULAR; INTRAVENOUS PRN
Status: DISCONTINUED | OUTPATIENT
Start: 2025-05-29 | End: 2025-05-29 | Stop reason: SURG

## 2025-05-29 RX ORDER — PHENYLEPHRINE HCL IN 0.9% NACL 1 MG/10 ML
SYRINGE (ML) INTRAVENOUS PRN
Status: DISCONTINUED | OUTPATIENT
Start: 2025-05-29 | End: 2025-05-29 | Stop reason: SURG

## 2025-05-29 RX ORDER — HYDRALAZINE HYDROCHLORIDE 20 MG/ML
5 INJECTION INTRAMUSCULAR; INTRAVENOUS
Status: DISCONTINUED | OUTPATIENT
Start: 2025-05-29 | End: 2025-05-30 | Stop reason: HOSPADM

## 2025-05-29 RX ORDER — METOPROLOL TARTRATE 1 MG/ML
1 INJECTION, SOLUTION INTRAVENOUS
Status: DISCONTINUED | OUTPATIENT
Start: 2025-05-29 | End: 2025-05-30 | Stop reason: HOSPADM

## 2025-05-29 RX ORDER — MIDAZOLAM HYDROCHLORIDE 1 MG/ML
1 INJECTION INTRAMUSCULAR; INTRAVENOUS
Status: DISCONTINUED | OUTPATIENT
Start: 2025-05-29 | End: 2025-05-30 | Stop reason: HOSPADM

## 2025-05-29 RX ORDER — GABAPENTIN 300 MG/1
300 CAPSULE ORAL ONCE
Status: DISCONTINUED | OUTPATIENT
Start: 2025-05-29 | End: 2025-05-29 | Stop reason: HOSPADM

## 2025-05-29 RX ORDER — HYDROMORPHONE HYDROCHLORIDE 1 MG/ML
0.1 INJECTION, SOLUTION INTRAMUSCULAR; INTRAVENOUS; SUBCUTANEOUS
Status: DISCONTINUED | OUTPATIENT
Start: 2025-05-29 | End: 2025-05-30 | Stop reason: HOSPADM

## 2025-05-29 RX ORDER — TRANEXAMIC ACID 100 MG/ML
INJECTION, SOLUTION INTRAVENOUS PRN
Status: DISCONTINUED | OUTPATIENT
Start: 2025-05-29 | End: 2025-05-29 | Stop reason: SURG

## 2025-05-29 RX ORDER — SUCCINYLCHOLINE CHLORIDE 20 MG/ML
INJECTION INTRAMUSCULAR; INTRAVENOUS PRN
Status: DISCONTINUED | OUTPATIENT
Start: 2025-05-29 | End: 2025-05-29 | Stop reason: SURG

## 2025-05-29 RX ORDER — DIPHENHYDRAMINE HYDROCHLORIDE 50 MG/ML
12.5 INJECTION, SOLUTION INTRAMUSCULAR; INTRAVENOUS
Status: DISCONTINUED | OUTPATIENT
Start: 2025-05-29 | End: 2025-05-30 | Stop reason: HOSPADM

## 2025-05-29 RX ORDER — LABETALOL HYDROCHLORIDE 5 MG/ML
5 INJECTION, SOLUTION INTRAVENOUS
Status: DISCONTINUED | OUTPATIENT
Start: 2025-05-29 | End: 2025-05-30 | Stop reason: HOSPADM

## 2025-05-29 RX ORDER — EPHEDRINE SULFATE 50 MG/ML
INJECTION, SOLUTION INTRAVENOUS PRN
Status: DISCONTINUED | OUTPATIENT
Start: 2025-05-29 | End: 2025-05-30 | Stop reason: HOSPADM

## 2025-05-29 RX ORDER — HYDROMORPHONE HYDROCHLORIDE 1 MG/ML
0.4 INJECTION, SOLUTION INTRAMUSCULAR; INTRAVENOUS; SUBCUTANEOUS
Status: DISCONTINUED | OUTPATIENT
Start: 2025-05-29 | End: 2025-05-30 | Stop reason: HOSPADM

## 2025-05-29 RX ORDER — EPHEDRINE SULFATE 50 MG/ML
5 INJECTION, SOLUTION INTRAVENOUS
Status: DISCONTINUED | OUTPATIENT
Start: 2025-05-29 | End: 2025-05-30 | Stop reason: HOSPADM

## 2025-05-29 RX ORDER — KETOROLAC TROMETHAMINE 15 MG/ML
INJECTION, SOLUTION INTRAMUSCULAR; INTRAVENOUS PRN
Status: DISCONTINUED | OUTPATIENT
Start: 2025-05-29 | End: 2025-05-29 | Stop reason: SURG

## 2025-05-29 RX ORDER — ALBUTEROL SULFATE 5 MG/ML
2.5 SOLUTION RESPIRATORY (INHALATION)
Status: DISCONTINUED | OUTPATIENT
Start: 2025-05-29 | End: 2025-05-30 | Stop reason: HOSPADM

## 2025-05-29 RX ORDER — SODIUM CHLORIDE, SODIUM LACTATE, POTASSIUM CHLORIDE, CALCIUM CHLORIDE 600; 310; 30; 20 MG/100ML; MG/100ML; MG/100ML; MG/100ML
INJECTION, SOLUTION INTRAVENOUS
Status: DISCONTINUED | OUTPATIENT
Start: 2025-05-29 | End: 2025-05-29 | Stop reason: SURG

## 2025-05-29 RX ADMIN — FENTANYL CITRATE 50 MCG: 50 INJECTION, SOLUTION INTRAMUSCULAR; INTRAVENOUS at 23:03

## 2025-05-29 RX ADMIN — SODIUM CHLORIDE, POTASSIUM CHLORIDE, SODIUM LACTATE AND CALCIUM CHLORIDE: 600; 310; 30; 20 INJECTION, SOLUTION INTRAVENOUS at 22:38

## 2025-05-29 RX ADMIN — KETOROLAC TROMETHAMINE 15 MG: 15 INJECTION, SOLUTION INTRAMUSCULAR; INTRAVENOUS at 23:04

## 2025-05-29 RX ADMIN — TRANEXAMIC ACID 1000 MG: 100 INJECTION, SOLUTION INTRAVENOUS at 23:04

## 2025-05-29 RX ADMIN — EPHEDRINE SULFATE 10 MG: 50 INJECTION, SOLUTION INTRAVENOUS at 22:55

## 2025-05-29 RX ADMIN — EPHEDRINE SULFATE 5 MG: 50 INJECTION, SOLUTION INTRAVENOUS at 22:48

## 2025-05-29 RX ADMIN — FENTANYL CITRATE 50 MCG: 50 INJECTION, SOLUTION INTRAMUSCULAR; INTRAVENOUS at 22:43

## 2025-05-29 RX ADMIN — ONDANSETRON 4 MG: 2 INJECTION INTRAMUSCULAR; INTRAVENOUS at 22:45

## 2025-05-29 RX ADMIN — PROPOFOL 150 MG: 10 INJECTION, EMULSION INTRAVENOUS at 22:43

## 2025-05-29 RX ADMIN — Medication 100 MCG: at 22:46

## 2025-05-29 RX ADMIN — DEXAMETHASONE SODIUM PHOSPHATE 4 MG: 4 INJECTION INTRA-ARTICULAR; INTRALESIONAL; INTRAMUSCULAR; INTRAVENOUS; SOFT TISSUE at 22:45

## 2025-05-29 RX ADMIN — SUCCINYLCHOLINE CHLORIDE 8080 MG: 20 INJECTION INTRAMUSCULAR; INTRAVENOUS at 22:43

## 2025-05-29 RX ADMIN — LIDOCAINE HYDROCHLORIDE 80 MG: 20 INJECTION, SOLUTION EPIDURAL; INFILTRATION; INTRACAUDAL; PERINEURAL at 22:43

## 2025-05-29 ASSESSMENT — PAIN DESCRIPTION - PAIN TYPE
TYPE: SURGICAL PAIN
TYPE: SURGICAL PAIN

## 2025-05-29 ASSESSMENT — FIBROSIS 4 INDEX: FIB4 SCORE: 0.31

## 2025-05-30 ENCOUNTER — PHARMACY VISIT (OUTPATIENT)
Dept: PHARMACY | Facility: MEDICAL CENTER | Age: 25
End: 2025-05-30
Payer: COMMERCIAL

## 2025-05-30 VITALS
TEMPERATURE: 97.6 F | HEART RATE: 109 BPM | SYSTOLIC BLOOD PRESSURE: 112 MMHG | BODY MASS INDEX: 27.42 KG/M2 | HEIGHT: 59 IN | RESPIRATION RATE: 16 BRPM | DIASTOLIC BLOOD PRESSURE: 63 MMHG | WEIGHT: 136.02 LBS | OXYGEN SATURATION: 95 %

## 2025-05-30 LAB
ERYTHROCYTE [DISTWIDTH] IN BLOOD BY AUTOMATED COUNT: 41.4 FL (ref 35.9–50)
HCT VFR BLD AUTO: 23.1 % (ref 37–47)
HGB BLD-MCNC: 7.6 G/DL (ref 12–16)
MCH RBC QN AUTO: 29.5 PG (ref 27–33)
MCHC RBC AUTO-ENTMCNC: 32.9 G/DL (ref 32.2–35.5)
MCV RBC AUTO: 89.5 FL (ref 81.4–97.8)
PATHOLOGY CONSULT NOTE: NORMAL
PLATELET # BLD AUTO: 315 K/UL (ref 164–446)
PMV BLD AUTO: 9 FL (ref 9–12.9)
RBC # BLD AUTO: 2.58 M/UL (ref 4.2–5.4)
WBC # BLD AUTO: 10.3 K/UL (ref 4.8–10.8)

## 2025-05-30 PROCEDURE — 36415 COLL VENOUS BLD VENIPUNCTURE: CPT

## 2025-05-30 PROCEDURE — 85027 COMPLETE CBC AUTOMATED: CPT

## 2025-05-30 ASSESSMENT — PAIN DESCRIPTION - PAIN TYPE
TYPE: SURGICAL PAIN
TYPE: SURGICAL PAIN

## 2025-05-30 NOTE — OP REPORT
PreOp Diagnosis: 1. Missed  at 5 weeks      PostOp Diagnosis: same      Procedure(s):  DILATION AND CURETTAGE    Surgeon(s):  Alexa Gómez D.O.    Anesthesiologist/Type of Anesthesia:  Anesthesiologist: Mandeep Potts M.D./* No anesthesia type entered *    Surgical Staff:  Circulator: Cooper Wayne R.N.; Charity Cronin R.N.  Scrub Person: Taye Singleton    Specimens removed if any:  ID Type Source Tests Collected by Time Destination   A : Retained products of conception Other Other PATHOLOGY SPECIMEN Alexa Gómez D.O. 2025 10:58 PM        Estimated Blood Loss: minimal    Findings: NEFG, uterus gravid and measured to 10 cm, measured approximately 8 cm at end of procedure.     Complications: none    Procedure: After informed consent was signed, the patient was taken to the operating room where general anesthesia was obtained without any difficulty.  She was then placed in the dorsolithotomy position and prepped and draped in the normal sterile fashion.  A catheter was used to drain the bladder prior to the procedure beginning.  A speculum was placed in the patient's vagina and a single-tooth tenaculum was used to grasp the posterior lip with this patient cervix.  Hanks dilators were then used to dilate the patient's cervix to allow for a flex tip 8 Japanese suction curette.  The suction curette was then introduced into the uterine cavity.  Curettage was performed in a 360 degree fashion obtaining a large amount of tissue and blood clots.  Sharp curette was then used circumferentially with a small amount of blood clots obtained.  The single-tooth tenaculum was then removed.  The tenaculum sites were hemostatic.  Methergine was given to ensure uterine tone and bimanual massage was performed also to ensure uterine tone.  The uterus felt firm and approximately 8 cm long in greatest length.  All instruments were then removed from the vagina.  Sponge and instrument counts were correct and the patient  was awakened and taken to the recovery room in stable condition.  Patient was discharged home with prescriptions for  doxycycline.

## 2025-05-30 NOTE — ED PROVIDER NOTES
"ED Provider Note    ED PHYSICIAN NOTE    CHIEF COMPLAINT  Chief Complaint   Patient presents with    Abnormal Labs     Sent by OBGYN for abnormal labs. Hemoglobin was 8.3. scheduled for DNC tomorrow but OB wanted her to come to ED. Reports going through approx 4 pads throughout the day. Intermittent 6/10 pain.        EXTERNAL RECORDS REVIEWED  Outpatient Notes outpatient visit through the lab is noted from OB as well as ER visit from May 25 quant 1787, ultrasound with irregular intrauterine gestational sac of 7 weeks 1 daysquant from yesterday was 1217    HPI/ROS  LIMITATION TO HISTORY   Select: : None  OUTSIDE HISTORIAN(S):  none    Korina Potts is a 24 y.o. female who presents with lower abdominal cramping as well as vaginal bleeding.  Patient was diagnosed with miscarriage recently and has continued to have some bleeding although it has slowed down.  The cramping is also slowed down somewhat.  She does have some occasional dizziness as well.  She was scheduled for D&C, went to outpatient clinic, was noted to have worsening anemia with hemoglobin of 8.3 and was sent in to the hospital for hospital-based procedure    PAST MEDICAL HISTORY  Past Medical History[1]    SOCIAL HISTORY  Social History[2]    CURRENT MEDICATIONS  Home Medications       Reviewed by Cecile Gipson R.N. (Registered Nurse) on 05/29/25 at 1911  Med List Status: Partial     Medication Last Dose Status   albuterol 108 (90 Base) MCG/ACT Aero Soln inhalation aerosol  Active   eletriptan (RELPAX) 40 MG tablet  Active   niMODipine (NIMOTOP) 30 MG Cap  Active   norethindrone (MICRONOR) 0.35 MG tablet  Active                    ALLERGIES  Allergies[3]    PHYSICAL EXAM  VITAL SIGNS: /78   Pulse 80   Temp 36.8 °C (98.2 °F) (Temporal)   Resp 16   Ht 1.499 m (4' 11\")   Wt 61.7 kg (136 lb 0.4 oz)   SpO2 98%   BMI 27.47 kg/m²    Constitutional: Awake and alert   HENT: Normal inspection, no signs of trauma  Eyes: Normal " inspection, Pupils equal, non-icteric  Neck: Grossly normal range of motion. No stridor  Cardiovascular: Regular rate and rhythm, no murmurs.     Thorax & Lungs: No respiratory distress,   Abdomen:  soft, non-distended, nontender, no mass  Skin: No obvious rash. Warm. Dry.   Extremities: No cyanosis, no edema  Neurologic: AO3, Grossly normal,   Psychiatric: Normal affect for situation        DIAGNOSTIC STUDIES / PROCEDURES  LABS/EKG  Labs Reviewed   CBC WITH DIFFERENTIAL - Abnormal; Notable for the following components:       Result Value    RBC 2.96 (*)     Hemoglobin 8.6 (*)     Hematocrit 26.7 (*)     MPV 8.7 (*)     All other components within normal limits   HCG QUAL SERUM - Abnormal; Notable for the following components:    Beta-Hcg Qualitative Serum Positive (*)     All other components within normal limits   HCG QUANTITATIVE - Abnormal; Notable for the following components:    Bhcg 1268.0 (*)     All other components within normal limits   COMP METABOLIC PANEL   LIPASE   ESTIMATED GFR   URINALYSIS,CULTURE IF INDICATED   COD (ADULT)             COURSE & MEDICAL DECISION MAKING    INITIAL ASSESSMENT, COURSE AND PLAN  Care Narrative: 8:42 PM  Patient presents with vaginal bleeding abdominal pain.  She has known miscarriage and has had some worsening anemia.  Dr. Gómez from OB/GYN came to the bedside quite promptly on the patient's arrival to the hospital discussed plan for OR for D&C       PROBLEM LIST    # Miscarriage.  D&C with OB/GYN    # Anemia.  No indication for transfusion at this point        DISPOSITION AND DISCUSSIONS  I have discussed management of the patient with the following physicians and ALICIA's:  as noted above    Patient is hospitalized in stable condition  FINAL DIAGNOSIS  1. Anemia, unspecified type        2. Miscarriage               This dictation was created using voice recognition software. The accuracy of the dictation is limited to the abilities of the software. I expect there may be  some errors of grammar and possibly content. The nursing notes were reviewed and certain aspects of this information were incorporated into this note.    Electronically signed by: Harris Casarez M.D., 5/29/2025          [1]   Past Medical History:  Diagnosis Date    Asthma     Migraine    [2]   Social History  Tobacco Use    Smoking status: Never    Smokeless tobacco: Never   Vaping Use    Vaping status: Never Used   Substance Use Topics    Alcohol use: Never    Drug use: Never   [3] No Known Allergies

## 2025-05-30 NOTE — ANESTHESIA POSTPROCEDURE EVALUATION
Patient: Korina Potts    Procedure Summary       Date: 25 Room / Location: [facility] OR 09 / SURGERY [facility]    Anesthesia Start:  Anesthesia Stop:     Procedure: DILATION AND CURETTAGE (Pelvis) Diagnosis: (MISSED  AT 5 WEEKS)    Surgeons: Alexa Gómez D.O. Responsible Provider: Mandeep Potts M.D.    Anesthesia Type: general ASA Status: 2            Final Anesthesia Type: general  Last vitals  BP   Blood Pressure: 112/63    Temp   36.4 °C (97.6 °F)    Pulse   (!) 109   Resp   16    SpO2   95 %      Anesthesia Post Evaluation    Patient location during evaluation: PACU  Patient participation: complete - patient participated  Level of consciousness: awake and alert    Airway patency: patent  Anesthetic complications: no  Cardiovascular status: hemodynamically stable  Respiratory status: acceptable  Hydration status: euvolemic    PONV: none          No notable events documented.     Nurse Pain Score: 4 (NPRS)

## 2025-05-30 NOTE — PROGRESS NOTES
2318 presented to PACU with OPA in place, VSS, slight hypotension noted, fluids increased. Resolved.    2345 PO fluids provided, tolerated well.    0005 family updated.    0005 Lab at bedside for draw    Pt recovered well post op. A&Ox4. VSS. Jayshree pad minimal spotting noted. Surgical pain managed to a tolerable level. Pt able to drink fluids without N/V.     0040 DC papers provided, all questions addressed at this time. IV removed prior to DC. All pt belongings with pt at time of DC.    0050 Pt wheeled down to  area. Spouse to transport pt home

## 2025-05-30 NOTE — OR SURGEON
Immediate Post OP Note    PreOp Diagnosis: 1. Missed  at 5 weeks      PostOp Diagnosis: same      Procedure(s):  DILATION AND CURETTAGE    Surgeon(s):  Alexa Gómez D.O.    Anesthesiologist/Type of Anesthesia:  Anesthesiologist: Mandeep Potts M.D./* No anesthesia type entered *    Surgical Staff:  Circulator: Copoer Wayne R.N.; Charity Cronin R.N.  Scrub Person: Taye Singleton    Specimens removed if any:  ID Type Source Tests Collected by Time Destination   A : Retained products of conception Other Other PATHOLOGY SPECIMEN Alexa Gómez D.O. 2025 10:58 PM        Estimated Blood Loss: minimal    Findings: NEFG, uterus gravid and measured to 10 cm, measured approximately 8 cm at end of procedure.     Complications: none        2025 11:21 PM Alexa Gómez D.O.

## 2025-05-30 NOTE — ED NOTES
Transport at bedside to transport pt to pre op. Pt alert and oriented with even and regular respirations on room air. Pt with all belongings and chart. Report called to receiving pre op RN.

## 2025-05-30 NOTE — ANESTHESIA PREPROCEDURE EVALUATION
Case: [id number] Date/Time: 05/29/25 2215    Procedure: DILATION AND CURETTAGE    Location: [facility] OR 09 / SURGERY [facility]    Surgeons: Alexa Gómez D.O.            Relevant Problems   PULMONARY   (positive) Asthma      NEURO   (positive) Migraine with aura and without status migrainosus, not intractable      CARDIAC   (positive) Migraine with aura and without status migrainosus, not intractable       Physical Exam    Airway   Mallampati: II  TM distance: >3 FB  Neck ROM: full       Cardiovascular - normal exam  Rhythm: regular  Rate: normal    (-) murmur     Dental - normal exam           Pulmonary - normal exam   Abdominal    Neurological - normal exam                   Anesthesia Plan    ASA 2       Plan - general       Airway plan will be ETT          Induction: intravenous    Postoperative Plan: Postoperative administration of opioids is intended.    Pertinent diagnostic labs and testing reviewed    Informed Consent:    Anesthetic plan and risks discussed with patient.    Use of blood products discussed with: patient whom consented to blood products.

## 2025-05-30 NOTE — ED TRIAGE NOTES
Chief Complaint   Patient presents with    Abnormal Labs     Sent by OBGYN for abnormal labs. Hemoglobin was 8.3. scheduled for DNC tomorrow but OB wanted her to come to ED. Reports going through approx 4 pads throughout the day. Intermittent 6/10 pain.       Patient ambulatory to triage for above complaint. Patient A&Ox4, GCS 15, patient speaking in full sentences. Equal and unlabored respirations. Patient educated on triage process and encouraged to notify staff if condition worsens. Appropriate protocols ordered. Patient returned to the lobby in stable condition.

## 2025-05-30 NOTE — ANESTHESIA TIME REPORT
Anesthesia Start and Stop Event Times       Date Time Event    5/29/2025 2230 Ready for Procedure     2238 Anesthesia Start     2320 Anesthesia Stop          Responsible Staff  05/29/25      Name Role Begin End    Mandeep Potts M.D. Anesth 2238 2320          Overtime Reason:  no overtime (within assigned shift)    Comments:

## 2025-05-30 NOTE — CONSULTS
ED Gyn Consultation Note      Consultation performed on behalf of Dr. Casarez    Chief Complaint   Patient presents with    Abnormal Labs     Sent by OBGYN for abnormal labs. Hemoglobin was 8.3. scheduled for DNC tomorrow but OB wanted her to come to ED. Reports going through approx 4 pads throughout the day. Intermittent 6/10 pain.        24-year-old  3 para 2-0-0-2 presents today for vaginal bleeding and diagnosis of missed . She was seen on 5/15 for a follow-up of a pregnancy of uncertain viability. At that time she was diagnosed with a missed miscarriage. After counseling she elected to proceed with expectant management. She reports that she started bleeding on  had heavy bleeding on  and presented to the ER at [facility]. Her pelvic ultrasound reportedly demonstrated a gestational sac within the uterine cavity as she reports that her hemoglobin was 10.9. She reports that she has not had any heavy bleeding since  but she has had ongoing bleeding/spotting. She states that she is dizzy when she is walking. The plan was to have a suction dilation and curettage in the office tomorrow morning but hemoglobin returned as 8.3 and her provider thought she would be safer to have that procedure done in a hospital setting given low hemoglobin and low reserve if she did get into bleeding during the procedure.     Review of systems:  Pertinent positives documented in HPI     Gyn history:   No hx STI, no hx abnormal paps    Past Medical History[1]  Past Surgical History[2]  OB History    Para Term  AB Living   2  0   2   SAB IAB Ectopic Molar Multiple Live Births              # Outcome Date GA Lbr Jeremy/2nd Weight Sex Type Anes PTL Lv   2             1                Obstetric Comments        Social History     Socioeconomic History    Marital status:      Spouse name: Not on file    Number of children: Not on file    Years of  "education: Not on file    Highest education level: Not on file   Occupational History    Not on file   Tobacco Use    Smoking status: Never    Smokeless tobacco: Never   Vaping Use    Vaping status: Never Used   Substance and Sexual Activity    Alcohol use: Never    Drug use: Never    Sexual activity: Yes     Partners: Male     Birth control/protection: Pill   Other Topics Concern    Not on file   Social History Narrative    Not on file     Social Drivers of Health     Financial Resource Strain: Not on file   Food Insecurity: Not on file   Transportation Needs: Not on file   Physical Activity: Not on file   Stress: Not on file   Social Connections: Not on file   Intimate Partner Violence: Not on file   Housing Stability: Not on file     Allergies: Patient has no known allergies.  Current Medications[3]    Pt with following problems:  Patient Active Problem List    Diagnosis Date Noted    Paresthesias 03/18/2024    Migraine with aura and without status migrainosus, not intractable 09/18/2023    Family history of autoimmune disorder 03/17/2023    Vitamin D deficiency 03/17/2023    Asthma 05/12/2020         Objective:      /69   Pulse 90   Temp 36.8 °C (98.2 °F) (Temporal)   Resp 16   Ht 1.499 m (4' 11\")   Wt 61.7 kg (136 lb 0.4 oz)   SpO2 97%     General:   no acute distress, alert, cooperative   Skin:   normal, no rashes   HEENT:  PERRLA and EOMI   Lungs:   normal respiratory effort   Heart:   RRR       Abdomen:   BS positive, ND, soft, NT x 4   EXT:  NT, no edema     Lab Review  Lab:    Recent Results (from the past 12 weeks)   HCG QUANTITATIVE    Collection Time: 04/16/25 12:29 PM   Result Value Ref Range    Bhcg 05807.0 (H) 0.0 - 5.0 mIU/mL   HCG QUANTITATIVE    Collection Time: 04/18/25  2:16 PM   Result Value Ref Range    Bhcg 28236.0 (H) 0.0 - 5.0 mIU/mL   HCG QUANTITATIVE    Collection Time: 05/19/25  9:06 AM   Result Value Ref Range    Bhcg 6547.0 (H) 0.0 - 5.0 mIU/mL   CBC WITH DIFFERENTIAL    " Collection Time: 05/25/25  8:55 AM   Result Value Ref Range    WBC 12.6 (H) 4.8 - 10.8 K/uL    RBC 3.68 (L) 4.20 - 5.40 M/uL    Hemoglobin 10.9 (L) 12.0 - 16.0 g/dL    Hematocrit 32.8 (L) 37.0 - 47.0 %    MCV 89.1 81.4 - 97.8 fL    MCH 29.6 27.0 - 33.0 pg    MCHC 33.2 32.2 - 35.5 g/dL    RDW 40.9 35.9 - 50.0 fL    Platelet Count 355 164 - 446 K/uL    MPV 9.3 9.0 - 12.9 fL    Neutrophils-Polys 79.20 (H) 44.00 - 72.00 %    Lymphocytes 14.90 (L) 22.00 - 41.00 %    Monocytes 3.80 0.00 - 13.40 %    Eosinophils 1.20 0.00 - 6.90 %    Basophils 0.40 0.00 - 1.80 %    Immature Granulocytes 0.50 0.00 - 0.90 %    Nucleated RBC 0.00 0.00 - 0.20 /100 WBC    Neutrophils (Absolute) 10.02 (H) 1.82 - 7.42 K/uL    Lymphs (Absolute) 1.88 1.00 - 4.80 K/uL    Monos (Absolute) 0.48 0.00 - 0.85 K/uL    Eos (Absolute) 0.15 0.00 - 0.51 K/uL    Baso (Absolute) 0.05 0.00 - 0.12 K/uL    Immature Granulocytes (abs) 0.06 0.00 - 0.11 K/uL    NRBC (Absolute) 0.00 K/uL   COMP METABOLIC PANEL    Collection Time: 05/25/25  8:55 AM   Result Value Ref Range    Sodium 135 135 - 145 mmol/L    Potassium 4.2 3.6 - 5.5 mmol/L    Chloride 104 96 - 112 mmol/L    Co2 21 20 - 33 mmol/L    Anion Gap 10.0 7.0 - 16.0    Glucose 131 (H) 65 - 99 mg/dL    Bun 12 8 - 22 mg/dL    Creatinine 0.67 0.50 - 1.40 mg/dL    Calcium 8.9 8.5 - 10.5 mg/dL    Correct Calcium 8.8 8.5 - 10.5 mg/dL    AST(SGOT) 18 12 - 45 U/L    ALT(SGPT) 18 2 - 50 U/L    Alkaline Phosphatase 76 30 - 99 U/L    Total Bilirubin <0.2 0.1 - 1.5 mg/dL    Albumin 4.1 3.2 - 4.9 g/dL    Total Protein 6.9 6.0 - 8.2 g/dL    Globulin 2.8 1.9 - 3.5 g/dL    A-G Ratio 1.5 g/dL   LIPASE    Collection Time: 05/25/25  8:55 AM   Result Value Ref Range    Lipase 30 11 - 82 U/L   HCG QUAL SERUM    Collection Time: 05/25/25  8:55 AM   Result Value Ref Range    Beta-Hcg Qualitative Serum Positive (A) Negative   ESTIMATED GFR    Collection Time: 05/25/25  8:55 AM   Result Value Ref Range    GFR (CKD-EPI) 123 >60 mL/min/1.73  m 2   HCG QUANTITATIVE    Collection Time: 05/25/25  8:55 AM   Result Value Ref Range    Bhcg 1787.0 (H) 0.0 - 5.0 mIU/mL   CBC WITH DIFFERENTIAL    Collection Time: 05/28/25 10:21 AM   Result Value Ref Range    WBC 7.0 4.8 - 10.8 K/uL    RBC 2.86 (L) 4.20 - 5.40 M/uL    Hemoglobin 8.3 (L) 12.0 - 16.0 g/dL    Hematocrit 26.3 (L) 37.0 - 47.0 %    MCV 92.0 81.4 - 97.8 fL    MCH 29.0 27.0 - 33.0 pg    MCHC 31.6 (L) 32.2 - 35.5 g/dL    RDW 42.6 35.9 - 50.0 fL    Platelet Count 351 164 - 446 K/uL    MPV 9.7 9.0 - 12.9 fL    Neutrophils-Polys 59.90 44.00 - 72.00 %    Lymphocytes 30.20 22.00 - 41.00 %    Monocytes 6.20 0.00 - 13.40 %    Eosinophils 2.90 0.00 - 6.90 %    Basophils 0.70 0.00 - 1.80 %    Immature Granulocytes 0.10 0.00 - 0.90 %    Nucleated RBC 0.00 0.00 - 0.20 /100 WBC    Neutrophils (Absolute) 4.17 1.82 - 7.42 K/uL    Lymphs (Absolute) 2.10 1.00 - 4.80 K/uL    Monos (Absolute) 0.43 0.00 - 0.85 K/uL    Eos (Absolute) 0.20 0.00 - 0.51 K/uL    Baso (Absolute) 0.05 0.00 - 0.12 K/uL    Immature Granulocytes (abs) 0.01 0.00 - 0.11 K/uL    NRBC (Absolute) 0.00 K/uL   HCG QUANTITATIVE    Collection Time: 05/28/25 10:21 AM   Result Value Ref Range    Bhcg 1217.0 (H) 0.0 - 5.0 mIU/mL   CBC WITH DIFFERENTIAL    Collection Time: 05/29/25  7:16 PM   Result Value Ref Range    WBC 9.6 4.8 - 10.8 K/uL    RBC 2.96 (L) 4.20 - 5.40 M/uL    Hemoglobin 8.6 (L) 12.0 - 16.0 g/dL    Hematocrit 26.7 (L) 37.0 - 47.0 %    MCV 90.2 81.4 - 97.8 fL    MCH 29.1 27.0 - 33.0 pg    MCHC 32.2 32.2 - 35.5 g/dL    RDW 42.0 35.9 - 50.0 fL    Platelet Count 379 164 - 446 K/uL    MPV 8.7 (L) 9.0 - 12.9 fL    Neutrophils-Polys 63.00 44.00 - 72.00 %    Lymphocytes 29.10 22.00 - 41.00 %    Monocytes 4.80 0.00 - 13.40 %    Eosinophils 2.40 0.00 - 6.90 %    Basophils 0.40 0.00 - 1.80 %    Immature Granulocytes 0.30 0.00 - 0.90 %    Nucleated RBC 0.00 0.00 - 0.20 /100 WBC    Neutrophils (Absolute) 6.03 1.82 - 7.42 K/uL    Lymphs (Absolute) 2.79 1.00 -  4.80 K/uL    Monos (Absolute) 0.46 0.00 - 0.85 K/uL    Eos (Absolute) 0.23 0.00 - 0.51 K/uL    Baso (Absolute) 0.04 0.00 - 0.12 K/uL    Immature Granulocytes (abs) 0.03 0.00 - 0.11 K/uL    NRBC (Absolute) 0.00 K/uL   COMP METABOLIC PANEL    Collection Time: 25  7:16 PM   Result Value Ref Range    Sodium 137 135 - 145 mmol/L    Potassium 3.7 3.6 - 5.5 mmol/L    Chloride 105 96 - 112 mmol/L    Co2 20 20 - 33 mmol/L    Anion Gap 12.0 7.0 - 16.0    Glucose 95 65 - 99 mg/dL    Bun 12 8 - 22 mg/dL    Creatinine 0.61 0.50 - 1.40 mg/dL    Calcium 8.7 8.5 - 10.5 mg/dL    Correct Calcium 8.5 8.5 - 10.5 mg/dL    AST(SGOT) 22 12 - 45 U/L    ALT(SGPT) 20 2 - 50 U/L    Alkaline Phosphatase 75 30 - 99 U/L    Total Bilirubin <0.2 0.1 - 1.5 mg/dL    Albumin 4.2 3.2 - 4.9 g/dL    Total Protein 7.1 6.0 - 8.2 g/dL    Globulin 2.9 1.9 - 3.5 g/dL    A-G Ratio 1.4 g/dL   LIPASE    Collection Time: 25  7:16 PM   Result Value Ref Range    Lipase 38 11 - 82 U/L   HCG QUAL SERUM    Collection Time: 25  7:16 PM   Result Value Ref Range    Beta-Hcg Qualitative Serum Positive (A) Negative   ESTIMATED GFR    Collection Time: 25  7:16 PM   Result Value Ref Range    GFR (CKD-EPI) 126 >60 mL/min/1.73 m 2        Assessment:   24 y.o.  here with missed , vaginal bleeding, and acute blood loss anemia     Plan:   #  plan to proceed with suction dilation and curettage for evacuation of retained POCs. Discussed risks of uterine perforation, bleeding, need for blood transfusion which she consents to, and infection. Patient will be discharged home with one weeks worth of doxycycline. FU in office with her primary OB in 1-2 weeks.        [1]   Past Medical History:  Diagnosis Date    Asthma     Migraine    [2]   Past Surgical History:  Procedure Laterality Date    APPENDECTOMY     [3] No current facility-administered medications for this encounter.    Current Outpatient Medications:     eletriptan (RELPAX) 40 MG tablet,  Take 1 Tablet by mouth one time as needed (migraine) for up to 1 dose. Can re-dose in 2 hours. Max is 2 in 24, Disp: 9 Tablet, Rfl: 11    niMODipine (NIMOTOP) 30 MG Cap, Take 1 Capsule by mouth 2 times a day., Disp: 60 Capsule, Rfl: 11    albuterol 108 (90 Base) MCG/ACT Aero Soln inhalation aerosol, Inhale 2 Puffs every 6 hours as needed for Shortness of Breath., Disp: 8.5 g, Rfl: 11    norethindrone (MICRONOR) 0.35 MG tablet, , Disp: , Rfl:

## 2025-05-30 NOTE — ANESTHESIA PROCEDURE NOTES
Airway    Date/Time: 5/29/2025 10:44 PM    Performed by: Mandeep Potts M.D.  Authorized by: Mandeep Potts M.D.    Location:  OR  Urgency:  Elective  Indications for Airway Management:  Anesthesia      Spontaneous Ventilation: absent    Sedation Level:  Deep  Preoxygenated: Yes    Patient Position:  Sniffing  Mask Difficulty Assessment:  0 - not attempted  Final Airway Type:  Endotracheal airway  Final Endotracheal Airway:  ETT  Cuffed: Yes    Technique Used for Successful ETT Placement:  Direct laryngoscopy    Insertion Site:  Oral  Blade Type:  Kelly  Laryngoscope Blade/Videolaryngoscope Blade Size:  3  ETT Size (mm):  6.0  Measured from:  Teeth  ETT to Teeth (cm):  21  Placement Verified by: auscultation and capnometry    Cormack-Lehane Classification:  Grade I - full view of glottis  Number of Attempts at Approach:  1   Atraumatic

## 2025-06-06 ENCOUNTER — OFFICE VISIT (OUTPATIENT)
Dept: URGENT CARE | Facility: PHYSICIAN GROUP | Age: 25
End: 2025-06-06
Payer: COMMERCIAL

## 2025-06-06 ENCOUNTER — HOSPITAL ENCOUNTER (OUTPATIENT)
Facility: MEDICAL CENTER | Age: 25
End: 2025-06-06
Attending: FAMILY MEDICINE
Payer: COMMERCIAL

## 2025-06-06 VITALS
RESPIRATION RATE: 20 BRPM | SYSTOLIC BLOOD PRESSURE: 92 MMHG | DIASTOLIC BLOOD PRESSURE: 54 MMHG | TEMPERATURE: 98.8 F | HEART RATE: 110 BPM | OXYGEN SATURATION: 98 % | HEIGHT: 59 IN | BODY MASS INDEX: 26.58 KG/M2 | WEIGHT: 131.84 LBS

## 2025-06-06 DIAGNOSIS — R68.89 FLU-LIKE SYMPTOMS: Primary | ICD-10-CM

## 2025-06-06 LAB
APPEARANCE UR: CLEAR
BILIRUB UR STRIP-MCNC: NEGATIVE MG/DL
COLOR UR AUTO: YELLOW
FLUAV RNA SPEC QL NAA+PROBE: NEGATIVE
FLUBV RNA SPEC QL NAA+PROBE: NEGATIVE
GLUCOSE UR STRIP.AUTO-MCNC: NEGATIVE MG/DL
KETONES UR STRIP.AUTO-MCNC: 15 MG/DL
LEUKOCYTE ESTERASE UR QL STRIP.AUTO: NEGATIVE
NITRITE UR QL STRIP.AUTO: NEGATIVE
PH UR STRIP.AUTO: 6.5 [PH] (ref 5–8)
PROT UR QL STRIP: 30 MG/DL
RBC UR QL AUTO: NORMAL
RSV RNA SPEC QL NAA+PROBE: NEGATIVE
SARS-COV-2 RNA RESP QL NAA+PROBE: NEGATIVE
SP GR UR STRIP.AUTO: 1.02
UROBILINOGEN UR STRIP-MCNC: 0.2 MG/DL

## 2025-06-06 PROCEDURE — 3074F SYST BP LT 130 MM HG: CPT | Performed by: FAMILY MEDICINE

## 2025-06-06 PROCEDURE — 3078F DIAST BP <80 MM HG: CPT | Performed by: FAMILY MEDICINE

## 2025-06-06 PROCEDURE — 81002 URINALYSIS NONAUTO W/O SCOPE: CPT | Performed by: FAMILY MEDICINE

## 2025-06-06 PROCEDURE — 0241U POCT CEPHEID COV-2, FLU A/B, RSV - PCR: CPT | Performed by: FAMILY MEDICINE

## 2025-06-06 PROCEDURE — 87086 URINE CULTURE/COLONY COUNT: CPT

## 2025-06-06 PROCEDURE — 99213 OFFICE O/P EST LOW 20 MIN: CPT | Performed by: FAMILY MEDICINE

## 2025-06-06 RX ORDER — PROMETHAZINE HYDROCHLORIDE 25 MG/1
25 TABLET ORAL EVERY 6 HOURS PRN
Qty: 15 TABLET | Refills: 0 | Status: SHIPPED | OUTPATIENT
Start: 2025-06-06

## 2025-06-06 ASSESSMENT — ENCOUNTER SYMPTOMS
DIARRHEA: 1
FEVER: 1
CHILLS: 1
NAUSEA: 1
ABDOMINAL PAIN: 0
COUGH: 1
VOMITING: 1

## 2025-06-06 ASSESSMENT — FIBROSIS 4 INDEX: FIB4 SCORE: 0.41

## 2025-06-06 NOTE — PROGRESS NOTES
"Subjective:     Korina Potts is a 24 y.o. female who presents for Fever (C/o fever highest 101.6F, body aches, n/v, diarrhea today. )    HPI  Pt presents for evaluation of an acute problem  Patient with an acute illness which started today   Had been feeling a little fatigued earlier in the week, which she attributed to recent D&C  Had a D&C 8 days ago and was started on doxycycline, but did not finish the medications (took about 3 days)   Has not had any abd pain since the D&C   Has had some very mild spotting since the procedure   Having fever, myalgias  Having nausea, vomiting, and diarrhea   Feeling very fatigued   Has had mild cough   No nasal congestion/rhinorrhea   No sore throat   Having some mild headaches   Highest fever at home 101 today     Kids recently vomited a few times over the past few days and self resolved     Review of Systems   Constitutional:  Positive for chills, fever and malaise/fatigue.   Respiratory:  Positive for cough.    Gastrointestinal:  Positive for diarrhea, nausea and vomiting. Negative for abdominal pain.       PMH:  has a past medical history of Asthma and Migraine.  MEDS: Current Medications[1]  ALLERGIES: Allergies[2]  SURGHX: Past Surgical History[3]  SOCHX:  reports that she has never smoked. She has never used smokeless tobacco. She reports that she does not drink alcohol and does not use drugs.     Objective:   BP 92/54 (BP Location: Left arm, Patient Position: Sitting, BP Cuff Size: Adult long)   Pulse (!) 110   Temp 37.1 °C (98.8 °F) (Temporal)   Resp 20   Ht 1.499 m (4' 11\")   Wt 59.8 kg (131 lb 13.4 oz)   SpO2 98%   BMI 26.63 kg/m²     Physical Exam  Constitutional:       General: She is not in acute distress.     Appearance: She is well-developed. She is not diaphoretic.   HENT:      Head: Normocephalic and atraumatic.      Right Ear: Tympanic membrane, ear canal and external ear normal.      Left Ear: Tympanic membrane, ear canal and external ear " normal.      Mouth/Throat:      Mouth: Mucous membranes are moist.      Pharynx: Oropharynx is clear. No oropharyngeal exudate or posterior oropharyngeal erythema.   Neck:      Trachea: No tracheal deviation.   Cardiovascular:      Rate and Rhythm: Normal rate and regular rhythm.   Pulmonary:      Effort: Pulmonary effort is normal. No respiratory distress.      Breath sounds: Normal breath sounds. No wheezing or rales.   Abdominal:      General: Abdomen is flat. There is no distension.      Palpations: Abdomen is soft.      Tenderness: There is no abdominal tenderness. There is no right CVA tenderness, left CVA tenderness, guarding or rebound.   Musculoskeletal:      Cervical back: Normal range of motion and neck supple. No tenderness.   Lymphadenopathy:      Cervical: No cervical adenopathy.   Skin:     General: Skin is warm and dry.      Findings: No rash.   Neurological:      Mental Status: She is alert.       Assessment/Plan:   Assessment    1. Flu-like symptoms  - POCT CEPHEID COV-2, FLU A/B, RSV - PCR  - POCT Urinalysis  - promethazine (PHENERGAN) 25 MG Tab; Take 1 Tablet by mouth every 6 hours as needed for Nausea/Vomiting.  Dispense: 15 Tablet; Refill: 0  - URINE CULTURE(NEW); Future    Patient with flulike illness.  Point-of-care urine within normal limits other than moderate blood.  She is still having some spotting since D&C.  She has no tenderness throughout the abdomen and do not suspect that her symptoms are related to the recent D&C.  Her children did have some recent vomiting that self resolved and might be viral gastroenteritis.  Point-of-care COVID/influenza/RSV all negative.  Suspect that patient has gastroenteritis and given promethazine.  Given strict ER precautions if symptoms are worsening or failing to improve.  Follow-up in urgent care as needed.         [1]   Current Outpatient Medications:     promethazine (PHENERGAN) 25 MG Tab, Take 1 Tablet by mouth every 6 hours as needed for  Nausea/Vomiting., Disp: 15 Tablet, Rfl: 0    albuterol 108 (90 Base) MCG/ACT Aero Soln inhalation aerosol, Inhale 2 Puffs every 6 hours as needed for Shortness of Breath., Disp: 8.5 g, Rfl: 11  [2] No Known Allergies  [3]   Past Surgical History:  Procedure Laterality Date    DILATION AND CURETTAGE N/A 5/29/2025    Procedure: DILATION AND CURETTAGE;  Surgeon: Alexa Gómez D.O.;  Location: [facility];  Service: Obstetrics    APPENDECTOMY

## 2025-06-06 NOTE — LETTER
June 6, 2025    To Whom It May Concern:         This is confirmation that Korina Potts attended her scheduled appointment with Raul Jenkins M.D. on 6/06/25.  Please excuse her from any days missed from work this week due to an acute illness.  She is anticipated to be well enough to return by 6/9/2025.  Thank you for making accommodations as she recovers.         If you have any questions please do not hesitate to call me at the phone number listed below.    Sincerely,          Raul Jenkins M.D.  [phone]

## 2025-06-07 DIAGNOSIS — R68.89 FLU-LIKE SYMPTOMS: ICD-10-CM

## 2025-06-09 LAB
BACTERIA UR CULT: NORMAL
SIGNIFICANT IND 70042: NORMAL
SITE SITE: NORMAL
SOURCE SOURCE: NORMAL

## (undated) DEVICE — GLOVE SZ 6 BIOGEL PI MICRO - PF LF (50PR/BX 4BX/CA)

## (undated) DEVICE — GOWN SURGEONS X-LARGE - DISP. (30/CA)

## (undated) DEVICE — GLOVE SZ 7.5 BIOGEL PI MICRO - PF LF (50PR/BX)

## (undated) DEVICE — CANISTER SUCTION 3000ML MECHANICAL FILTER AUTO SHUTOFF MEDI-VAC NONSTERILE LF DISP (40EA/CA)

## (undated) DEVICE — LACTATED RINGERS INJ 1000 ML - (14EA/CA 60CA/PF)

## (undated) DEVICE — Device

## (undated) DEVICE — TRAY SRGPRP PVP IOD WT PRP - (20/CA)

## (undated) DEVICE — TUBING CLEARLINK DUO-VENT - C-FLO (48EA/CA)

## (undated) DEVICE — TOWELS CLOTH SURGICAL - (4/PK 20PK/CA)

## (undated) DEVICE — SUCTION INSTRUMENT YANKAUER BULBOUS TIP W/O VENT (50EA/CA)

## (undated) DEVICE — VACURETTE 'F' TIP 8MM 10/PKG

## (undated) DEVICE — COVER PROBE INTRAOPERATIVE KIT (10EA/CA)

## (undated) DEVICE — BRIEF STRETCH MATERNITY M/L - FITS 20-60IN (5EA/BG 20BG/CA)

## (undated) DEVICE — SLEEVE VASO DVT COMPRESSION CALF MED - (10PR/CA)

## (undated) DEVICE — TUBING D & E COLLECTION SET (50EA/PK)

## (undated) DEVICE — SET LEADWIRE 5 LEAD BEDSIDE DISPOSABLE ECG (1SET OF 5/EA)

## (undated) DEVICE — SET EXTENSION WITH 2 PORTS (48EA/CA) ***PART #2C8610 IS A SUBSTITUTE*****

## (undated) DEVICE — GLOVE BIOGEL PI INDICATOR SZ 7.5 SURGICAL PF LF -(50/BX 4BX/CA)

## (undated) DEVICE — COVER LIGHT HANDLE ALC PLUS DISP (18EA/BX)

## (undated) DEVICE — PAD SANITARY 11IN MAXI IND WRAPPED (12EA/PK 24PK/CA)

## (undated) DEVICE — GLOVE BIOGEL INDICATOR SZ 6 SURGICAL PF LTX -(50/BX)

## (undated) DEVICE — SENSOR OXIMETER ADULT SPO2 RD SET (20EA/BX)